# Patient Record
Sex: FEMALE | Race: WHITE | NOT HISPANIC OR LATINO | Employment: UNEMPLOYED | URBAN - METROPOLITAN AREA
[De-identification: names, ages, dates, MRNs, and addresses within clinical notes are randomized per-mention and may not be internally consistent; named-entity substitution may affect disease eponyms.]

---

## 2017-01-25 PROBLEM — R10.9 FLANK PAIN, ACUTE: Status: ACTIVE | Noted: 2017-01-25

## 2017-02-07 ENCOUNTER — NURSE TRIAGE (OUTPATIENT)
Dept: ADMINISTRATIVE | Facility: CLINIC | Age: 42
End: 2017-02-07

## 2017-02-07 PROBLEM — A41.9 SEPSIS: Status: ACTIVE | Noted: 2017-02-07

## 2017-02-07 NOTE — TELEPHONE ENCOUNTER
Pt crying and moaning on the phone- complaining of 10/10 right flank pain. Advised to go to ER for further eval    Reason for Disposition   SEVERE pain (e.g., excruciating, scale 8-10) and present > 1 hour    Protocols used: ST FLANK PAIN-A-[facility]

## 2017-02-08 PROBLEM — N20.0 RENAL CALCULI: Status: ACTIVE | Noted: 2017-02-08

## 2017-02-08 PROBLEM — N12 PYELONEPHRITIS: Status: ACTIVE | Noted: 2017-02-08

## 2017-02-14 PROBLEM — A41.9 SEPSIS: Status: RESOLVED | Noted: 2017-02-07 | Resolved: 2017-02-14

## 2017-03-15 PROBLEM — N20.0 NEPHROLITHIASIS: Status: ACTIVE | Noted: 2017-03-15

## 2017-05-28 PROBLEM — I26.99 PULMONARY EMBOLUS: Status: ACTIVE | Noted: 2017-05-28

## 2017-05-29 PROBLEM — Z91.199 HISTORY OF NONCOMPLIANCE WITH MEDICAL TREATMENT: Status: ACTIVE | Noted: 2017-05-29

## 2017-06-24 PROBLEM — R07.9 CHEST PAIN: Status: ACTIVE | Noted: 2017-06-24

## 2017-06-25 PROBLEM — F17.200 TOBACCO DEPENDENCE: Status: ACTIVE | Noted: 2017-06-25

## 2017-07-18 ENCOUNTER — NURSE TRIAGE (OUTPATIENT)
Dept: ADMINISTRATIVE | Facility: CLINIC | Age: 42
End: 2017-07-18

## 2017-07-18 NOTE — TELEPHONE ENCOUNTER
"  Reason for Disposition   [1] SEVERE pain (e.g., excruciating, scale 8-10) AND [2] present > 1 hour    Answer Assessment - Initial Assessment Questions  1. LOCATION: "Where does it hurt?" (e.g., left, right)      Flank area bilaterally  2. ONSET: "When did the pain start?"      Ongoing x months  3. SEVERITY: "How bad is the pain?" (e.g., Scale 1-10; mild, moderate, or severe)    - MILD (1-3): doesn't interfere with normal activities     - MODERATE (4-7): interferes with normal activities or awakens from sleep     - SEVERE (8-10): excruciating pain and patient unable to do normal activities (stays in bed)        Now 10/10  4. PATTERN: "Does the pain come and go, or is it constant?"       Now constant  5. CAUSE: "What do you think is causing the pain?"      R/t kidney stones  6. OTHER SYMPTOMS:  "Do you have any other symptoms?" (e.g., fever, abdominal pain, vomiting, leg weakness, burning with urination, blood in urine)      Temp 100.2/+ nausea and vomiting/ denies dysuria or hematuria  7. PREGNANCY:  "Is there any chance you are pregnant?" "When was your last menstrual period?"      Didn't ask    Protocols used: ST FLANK PAIN-A-AH  pt had lithotripsy in Feb and again in March/ states she only passes a small amount of urine @ a time since lithotripsy/ hasn't spoken w/ urology about this problem/ now has flank and side pain/  today states flank and side pain 10/10/ + temp 100.2/ + nausea and vomiting    To ER for evaluation and tx    Dania Arellano RN  "

## 2017-10-02 PROBLEM — I26.99 OTHER PULMONARY EMBOLISM WITHOUT ACUTE COR PULMONALE: Status: ACTIVE | Noted: 2017-10-02

## 2017-10-03 PROBLEM — F20.9 SCHIZOPHRENIA: Status: ACTIVE | Noted: 2017-10-03

## 2017-11-19 PROBLEM — K92.2 GASTROINTESTINAL HEMORRHAGE: Status: ACTIVE | Noted: 2017-11-19

## 2017-11-19 PROBLEM — K92.0 HEMATEMESIS: Status: ACTIVE | Noted: 2017-11-19

## 2017-12-07 PROBLEM — R05.9 COUGH WITH FEVER: Status: ACTIVE | Noted: 2017-12-07

## 2017-12-07 PROBLEM — J98.11 ATELECTASIS OF RIGHT LUNG: Status: ACTIVE | Noted: 2017-12-07

## 2017-12-07 PROBLEM — R50.9 COUGH WITH FEVER: Status: ACTIVE | Noted: 2017-12-07

## 2017-12-07 PROBLEM — J20.9 BRONCHITIS WITH ASTHMA, ACUTE: Status: ACTIVE | Noted: 2017-12-07

## 2017-12-07 PROBLEM — J45.909 BRONCHITIS WITH ASTHMA, ACUTE: Status: ACTIVE | Noted: 2017-12-07

## 2018-03-25 PROBLEM — N30.00 ACUTE CYSTITIS WITHOUT HEMATURIA: Status: ACTIVE | Noted: 2018-03-25

## 2018-03-25 PROBLEM — R09.1 PLEURISY: Status: ACTIVE | Noted: 2018-03-25

## 2018-10-26 PROBLEM — I26.99 BILATERAL PULMONARY EMBOLISM: Status: ACTIVE | Noted: 2018-10-26

## 2019-05-20 PROBLEM — N39.0 COMPLICATED UTI (URINARY TRACT INFECTION): Status: ACTIVE | Noted: 2019-05-20

## 2019-06-27 PROBLEM — K92.0 HEMATEMESIS: Status: RESOLVED | Noted: 2017-11-19 | Resolved: 2019-06-27

## 2019-07-08 PROBLEM — Z01.818 PRE-OP EXAM: Status: ACTIVE | Noted: 2019-07-08

## 2020-02-27 PROBLEM — R04.2 HEMOPTYSIS: Status: ACTIVE | Noted: 2020-02-27

## 2020-02-27 PROBLEM — Z86.711 HISTORY OF PULMONARY EMBOLISM: Chronic | Status: ACTIVE | Noted: 2020-02-27

## 2020-02-27 PROBLEM — K80.20 CHOLELITHIASIS: Chronic | Status: ACTIVE | Noted: 2020-02-27

## 2020-02-27 PROBLEM — J18.9 CAP (COMMUNITY ACQUIRED PNEUMONIA): Status: ACTIVE | Noted: 2020-02-27

## 2020-02-29 PROBLEM — J06.9 VIRAL UPPER RESPIRATORY TRACT INFECTION: Status: ACTIVE | Noted: 2020-02-27

## 2020-03-06 PROBLEM — K80.50 BILIARY COLIC: Status: ACTIVE | Noted: 2020-03-06

## 2020-06-15 PROBLEM — Z98.890 S/P CARDIAC CATH: Status: ACTIVE | Noted: 2020-06-15

## 2020-06-15 PROBLEM — R94.39 ABNORMAL STRESS TEST: Status: ACTIVE | Noted: 2020-06-15

## 2020-06-23 PROBLEM — K80.10 CALCULUS OF GALLBLADDER WITH CHRONIC CHOLECYSTITIS WITHOUT OBSTRUCTION: Status: ACTIVE | Noted: 2020-06-23

## 2020-06-28 ENCOUNTER — NURSE TRIAGE (OUTPATIENT)
Dept: ADMINISTRATIVE | Facility: CLINIC | Age: 45
End: 2020-06-28

## 2020-06-28 NOTE — TELEPHONE ENCOUNTER
Post procedure symptom tracker. Pt presented to ED on 6/26/20 for post op pain 11 days post op. Denied cough, fever, or SOB. Per symptom tracker protocol, no contact made at this time. No follow up needed.    Reason for Disposition   Caller has cancelled the call before the first contact    Protocols used: NO CONTACT OR DUPLICATE CONTACT CALL-A-AH

## 2020-06-30 PROBLEM — R94.39 ABNORMAL STRESS TEST: Status: RESOLVED | Noted: 2020-06-15 | Resolved: 2020-06-30

## 2020-06-30 PROBLEM — I25.10 NON-OCCLUSIVE CORONARY ARTERY DISEASE: Status: ACTIVE | Noted: 2020-06-30

## 2020-07-06 ENCOUNTER — NURSE TRIAGE (OUTPATIENT)
Dept: ADMINISTRATIVE | Facility: CLINIC | Age: 45
End: 2020-07-06

## 2020-07-07 NOTE — TELEPHONE ENCOUNTER
Post Procedural Symptom Tracker. Pt had a hospital visit on 7/1/20Per symptom tracker protocol, no contact made. No follow up needed.  Reason for Disposition   Patient already left for the hospital/clinic.    Additional Information   Negative: Caller is angry or rude (e.g., hangs up, verbally abusive, yelling)   Negative: Caller hangs up   Negative: Caller has already spoken with the PCP and has no further questions.   Negative: Caller has already spoken with another triager and has no further questions.   Negative: Caller has already spoken with another triager or PCP AND has further questions AND triager able to answer questions.   Negative: Message left on identified voice mail   Negative: Message left on unidentified voice mail.  Phone number verified.   Negative: Message left with person in household.   Negative: Wrong number reached.  Phone number verified.   Negative: Second attempt to contact family AND no contact made.  Phone number verified.   Negative: Cell phone out of range.  Phone number verified.   Negative: Pager number given.  Answering service notified.    Protocols used: NO CONTACT OR [facility]PLIC[facility]

## 2020-11-09 PROBLEM — M79.605 LEG PAIN, DIFFUSE, LEFT: Status: ACTIVE | Noted: 2020-11-09

## 2020-11-09 PROBLEM — L03.90 CELLULITIS: Status: ACTIVE | Noted: 2020-11-09

## 2020-11-09 PROBLEM — L03.90 CELLULITIS: Status: RESOLVED | Noted: 2020-11-09 | Resolved: 2020-11-09

## 2020-11-09 PROBLEM — L03.116 CELLULITIS OF LEFT LOWER EXTREMITY: Status: ACTIVE | Noted: 2020-11-09

## 2020-11-14 PROBLEM — R07.81 PLEURITIC CHEST PAIN: Status: ACTIVE | Noted: 2017-06-24

## 2020-11-14 PROBLEM — R55 NEAR SYNCOPE: Status: ACTIVE | Noted: 2020-11-14

## 2020-11-14 PROBLEM — R06.02 SOB (SHORTNESS OF BREATH): Status: ACTIVE | Noted: 2020-11-14

## 2021-04-03 PROBLEM — K52.9 GASTROENTERITIS: Status: ACTIVE | Noted: 2021-04-03

## 2021-05-19 PROBLEM — R13.10 DYSPHAGIA: Status: ACTIVE | Noted: 2021-05-19

## 2021-05-23 PROBLEM — I82.4Y2 ACUTE DEEP VEIN THROMBOSIS (DVT) OF PROXIMAL VEIN OF LEFT LOWER EXTREMITY: Status: ACTIVE | Noted: 2021-05-23

## 2021-05-25 PROBLEM — I26.99 ACUTE PULMONARY EMBOLISM: Status: RESOLVED | Noted: 2017-05-28 | Resolved: 2021-05-25

## 2021-05-30 PROBLEM — I31.39 PERICARDIAL EFFUSION (NONINFLAMMATORY): Status: ACTIVE | Noted: 2021-05-30

## 2021-05-30 PROBLEM — I82.432 ACUTE DEEP VEIN THROMBOSIS (DVT) OF POPLITEAL VEIN OF LEFT LOWER EXTREMITY: Status: ACTIVE | Noted: 2021-05-30

## 2021-05-30 PROBLEM — M79.89 LEG SWELLING: Status: ACTIVE | Noted: 2021-05-30

## 2021-05-30 PROBLEM — I31.39 PERICARDIAL EFFUSION: Status: ACTIVE | Noted: 2021-05-30

## 2022-04-05 PROBLEM — R55 COUGH SYNCOPE: Status: ACTIVE | Noted: 2022-04-05

## 2022-04-05 PROBLEM — J45.40 MODERATE PERSISTENT ASTHMA WITHOUT COMPLICATION: Status: ACTIVE | Noted: 2022-04-05

## 2022-04-05 PROBLEM — F31.32 BIPOLAR 1 DISORDER, DEPRESSED, MODERATE: Status: ACTIVE | Noted: 2022-04-05

## 2022-04-05 PROBLEM — R05.4 COUGH SYNCOPE: Status: ACTIVE | Noted: 2022-04-05

## 2022-04-05 PROBLEM — E87.1 HYPONATREMIA: Status: ACTIVE | Noted: 2022-04-05

## 2022-04-05 PROBLEM — G47.33 OSA (OBSTRUCTIVE SLEEP APNEA): Status: ACTIVE | Noted: 2022-04-05

## 2022-04-05 PROBLEM — D75.1 POLYCYTHEMIA: Status: ACTIVE | Noted: 2022-04-05

## 2022-04-05 PROBLEM — E66.01 CLASS 3 SEVERE OBESITY DUE TO EXCESS CALORIES WITH SERIOUS COMORBIDITY AND BODY MASS INDEX (BMI) OF 45.0 TO 49.9 IN ADULT: Status: ACTIVE | Noted: 2022-04-05

## 2022-04-05 PROBLEM — Z79.899 POLYPHARMACY: Status: ACTIVE | Noted: 2022-04-05

## 2022-04-06 PROBLEM — R20.0 NUMBNESS AND TINGLING OF RIGHT FACE: Status: ACTIVE | Noted: 2022-04-06

## 2022-04-06 PROBLEM — R20.2 NUMBNESS AND TINGLING OF RIGHT FACE: Status: ACTIVE | Noted: 2022-04-06

## 2023-04-22 PROBLEM — R29.90 NEUROLOGICAL SYMPTOMS: Status: ACTIVE | Noted: 2023-04-22

## 2023-04-23 ENCOUNTER — HOSPITAL ENCOUNTER (OUTPATIENT)
Facility: HOSPITAL | Age: 48
Discharge: HOME | End: 2023-04-25
Attending: FAMILY MEDICINE | Admitting: FAMILY MEDICINE
Payer: MEDICAID

## 2023-04-23 DIAGNOSIS — G45.9 TIA (TRANSIENT ISCHEMIC ATTACK): ICD-10-CM

## 2023-04-23 DIAGNOSIS — R20.0 NUMBNESS AND TINGLING OF LEFT SIDE OF FACE: Primary | ICD-10-CM

## 2023-04-23 DIAGNOSIS — R20.2 NUMBNESS AND TINGLING OF LEFT SIDE OF FACE: Primary | ICD-10-CM

## 2023-04-23 DIAGNOSIS — R42 DIZZINESS: ICD-10-CM

## 2023-04-23 LAB
CHOLEST SERPL-MCNC: 178 MG/DL (ref 120–199)
CHOLEST/HDLC SERPL: 3.6 {RATIO} (ref 2–5)
ESTIMATED AVG GLUCOSE: 111 MG/DL (ref 68–131)
HBA1C MFR BLD: 5.5 % (ref 4–5.6)
HDLC SERPL-MCNC: 49 MG/DL (ref 40–75)
HDLC SERPL: 27.5 % (ref 20–50)
LDLC SERPL CALC-MCNC: 106 MG/DL (ref 63–159)
NONHDLC SERPL-MCNC: 129 MG/DL
TRIGL SERPL-MCNC: 115 MG/DL (ref 30–150)
TSH SERPL DL<=0.005 MIU/L-ACNC: 1.63 UIU/ML (ref 0.4–4)
VIT B12 SERPL-MCNC: 307 PG/ML (ref 210–950)

## 2023-04-23 PROCEDURE — G0379 DIRECT REFER HOSPITAL OBSERV: HCPCS

## 2023-04-23 PROCEDURE — 80061 LIPID PANEL: CPT | Performed by: STUDENT IN AN ORGANIZED HEALTH CARE EDUCATION/TRAINING PROGRAM

## 2023-04-23 PROCEDURE — 94761 N-INVAS EAR/PLS OXIMETRY MLT: CPT

## 2023-04-23 PROCEDURE — 99900035 HC TECH TIME PER 15 MIN (STAT)

## 2023-04-23 PROCEDURE — 25500020 PHARM REV CODE 255: Performed by: FAMILY MEDICINE

## 2023-04-23 PROCEDURE — 82607 VITAMIN B-12: CPT | Performed by: STUDENT IN AN ORGANIZED HEALTH CARE EDUCATION/TRAINING PROGRAM

## 2023-04-23 PROCEDURE — 99223 PR INITIAL HOSPITAL CARE,LEVL III: ICD-10-PCS | Mod: ,,, | Performed by: PSYCHIATRY & NEUROLOGY

## 2023-04-23 PROCEDURE — 36415 COLL VENOUS BLD VENIPUNCTURE: CPT | Performed by: STUDENT IN AN ORGANIZED HEALTH CARE EDUCATION/TRAINING PROGRAM

## 2023-04-23 PROCEDURE — 83036 HEMOGLOBIN GLYCOSYLATED A1C: CPT | Performed by: STUDENT IN AN ORGANIZED HEALTH CARE EDUCATION/TRAINING PROGRAM

## 2023-04-23 PROCEDURE — 25000003 PHARM REV CODE 250: Performed by: STUDENT IN AN ORGANIZED HEALTH CARE EDUCATION/TRAINING PROGRAM

## 2023-04-23 PROCEDURE — 84443 ASSAY THYROID STIM HORMONE: CPT | Performed by: STUDENT IN AN ORGANIZED HEALTH CARE EDUCATION/TRAINING PROGRAM

## 2023-04-23 PROCEDURE — 84425 ASSAY OF VITAMIN B-1: CPT | Performed by: STUDENT IN AN ORGANIZED HEALTH CARE EDUCATION/TRAINING PROGRAM

## 2023-04-23 PROCEDURE — G0378 HOSPITAL OBSERVATION PER HR: HCPCS

## 2023-04-23 PROCEDURE — 99223 1ST HOSP IP/OBS HIGH 75: CPT | Mod: ,,, | Performed by: PSYCHIATRY & NEUROLOGY

## 2023-04-23 RX ORDER — ALBUTEROL SULFATE 90 UG/1
2 AEROSOL, METERED RESPIRATORY (INHALATION) EVERY 6 HOURS PRN
Status: DISCONTINUED | OUTPATIENT
Start: 2023-04-23 | End: 2023-04-25 | Stop reason: HOSPADM

## 2023-04-23 RX ORDER — CLONIDINE HYDROCHLORIDE 0.1 MG/1
0.2 TABLET ORAL NIGHTLY
Status: DISCONTINUED | OUTPATIENT
Start: 2023-04-23 | End: 2023-04-25 | Stop reason: HOSPADM

## 2023-04-23 RX ORDER — HYDROCHLOROTHIAZIDE 12.5 MG/1
12.5 TABLET ORAL DAILY
Status: DISCONTINUED | OUTPATIENT
Start: 2023-04-23 | End: 2023-04-25 | Stop reason: HOSPADM

## 2023-04-23 RX ORDER — LABETALOL HYDROCHLORIDE 5 MG/ML
10 INJECTION, SOLUTION INTRAVENOUS
Status: DISCONTINUED | OUTPATIENT
Start: 2023-04-23 | End: 2023-04-25 | Stop reason: HOSPADM

## 2023-04-23 RX ORDER — SODIUM CHLORIDE 0.9 % (FLUSH) 0.9 %
10 SYRINGE (ML) INJECTION
Status: DISCONTINUED | OUTPATIENT
Start: 2023-04-23 | End: 2023-04-25 | Stop reason: HOSPADM

## 2023-04-23 RX ORDER — DEXTROSE 40 %
30 GEL (GRAM) ORAL
Status: DISCONTINUED | OUTPATIENT
Start: 2023-04-23 | End: 2023-04-25 | Stop reason: HOSPADM

## 2023-04-23 RX ORDER — CETIRIZINE HYDROCHLORIDE 10 MG/1
10 TABLET ORAL DAILY
Status: DISCONTINUED | OUTPATIENT
Start: 2023-04-23 | End: 2023-04-25 | Stop reason: HOSPADM

## 2023-04-23 RX ORDER — HYDROXYZINE PAMOATE 25 MG/1
50 CAPSULE ORAL NIGHTLY
Status: DISCONTINUED | OUTPATIENT
Start: 2023-04-23 | End: 2023-04-25 | Stop reason: HOSPADM

## 2023-04-23 RX ORDER — OXCARBAZEPINE 150 MG/1
600 TABLET, FILM COATED ORAL 2 TIMES DAILY
Status: DISCONTINUED | OUTPATIENT
Start: 2023-04-23 | End: 2023-04-25 | Stop reason: HOSPADM

## 2023-04-23 RX ORDER — ATORVASTATIN CALCIUM 40 MG/1
40 TABLET, FILM COATED ORAL DAILY
Status: DISCONTINUED | OUTPATIENT
Start: 2023-04-23 | End: 2023-04-25 | Stop reason: HOSPADM

## 2023-04-23 RX ORDER — DEXTROSE 40 %
15 GEL (GRAM) ORAL
Status: DISCONTINUED | OUTPATIENT
Start: 2023-04-23 | End: 2023-04-25 | Stop reason: HOSPADM

## 2023-04-23 RX ORDER — BUSPIRONE HYDROCHLORIDE 5 MG/1
10 TABLET ORAL 3 TIMES DAILY
Status: DISCONTINUED | OUTPATIENT
Start: 2023-04-23 | End: 2023-04-25 | Stop reason: HOSPADM

## 2023-04-23 RX ORDER — QUETIAPINE FUMARATE 100 MG/1
400 TABLET, FILM COATED ORAL NIGHTLY
Status: DISCONTINUED | OUTPATIENT
Start: 2023-04-23 | End: 2023-04-25 | Stop reason: HOSPADM

## 2023-04-23 RX ORDER — METOPROLOL TARTRATE 25 MG/1
25 TABLET, FILM COATED ORAL DAILY
Status: DISCONTINUED | OUTPATIENT
Start: 2023-04-23 | End: 2023-04-25 | Stop reason: HOSPADM

## 2023-04-23 RX ORDER — CLONAZEPAM 0.5 MG/1
0.5 TABLET ORAL 2 TIMES DAILY PRN
Status: DISCONTINUED | OUTPATIENT
Start: 2023-04-23 | End: 2023-04-25 | Stop reason: HOSPADM

## 2023-04-23 RX ORDER — VALSARTAN 40 MG/1
80 TABLET ORAL 2 TIMES DAILY
Status: DISCONTINUED | OUTPATIENT
Start: 2023-04-23 | End: 2023-04-25 | Stop reason: HOSPADM

## 2023-04-23 RX ADMIN — HYDROXYZINE PAMOATE 50 MG: 25 CAPSULE ORAL at 08:04

## 2023-04-23 RX ADMIN — IOHEXOL 100 ML: 350 INJECTION, SOLUTION INTRAVENOUS at 01:04

## 2023-04-23 RX ADMIN — RIVAROXABAN 20 MG: 20 TABLET, FILM COATED ORAL at 01:04

## 2023-04-23 RX ADMIN — BUSPIRONE HYDROCHLORIDE 10 MG: 5 TABLET ORAL at 03:04

## 2023-04-23 RX ADMIN — CETIRIZINE HYDROCHLORIDE 10 MG: 10 TABLET, FILM COATED ORAL at 05:04

## 2023-04-23 RX ADMIN — ISOSORBIDE DINITRATE 30 MG: 10 TABLET ORAL at 05:04

## 2023-04-23 RX ADMIN — OXCARBAZEPINE 600 MG: 150 TABLET, FILM COATED ORAL at 08:04

## 2023-04-23 RX ADMIN — CLONAZEPAM 0.5 MG: 0.5 TABLET ORAL at 03:04

## 2023-04-23 RX ADMIN — VALSARTAN 80 MG: 40 TABLET, FILM COATED ORAL at 08:04

## 2023-04-23 RX ADMIN — QUETIAPINE FUMARATE 400 MG: 100 TABLET ORAL at 08:04

## 2023-04-23 RX ADMIN — HYDROCHLOROTHIAZIDE 12.5 MG: 12.5 TABLET ORAL at 01:04

## 2023-04-23 RX ADMIN — BUSPIRONE HYDROCHLORIDE 10 MG: 5 TABLET ORAL at 09:04

## 2023-04-23 RX ADMIN — ATORVASTATIN CALCIUM 40 MG: 40 TABLET, FILM COATED ORAL at 01:04

## 2023-04-23 RX ADMIN — METOPROLOL TARTRATE 25 MG: 25 TABLET, FILM COATED ORAL at 05:04

## 2023-04-23 RX ADMIN — CLONIDINE HYDROCHLORIDE 0.2 MG: 0.1 TABLET ORAL at 08:04

## 2023-04-23 NOTE — PHARMACY MED REC
"  [facility]           Pharmacy  Admission Medication History     The home medication history was taken by Faith Ray PharmD.      Medication history obtained from Medications listed below were obtained from: Patient/family    Based on information gathered for medication list, you may go to "Admission" then "Reconcile Home Medications" tabs to review and/or act upon those items.     The home medication list has been updated by the Pharmacy department.   Please read ALL comments highlighted in yellow.   Please address this information as you see fit.    Feel free to contact us if you have any questions or require assistance.    The current inpatient medication list has been compared to the home medication list and the following discrepancies were noted:    Patient reports NOT TAKING the following medication(s):    Patient reports he/she IS TAKING the following which was not ordered upon admit  ISOSORBIDE DINITRATE 30MG DAILY  Patient reports taking a DIFFERENT DRUG than that ordered upon admit    Patient reports taking a drug DIFFERENTLY than how ordered upon admit      Potential issues to be addressed PRIOR TO DISCHARGE      Current Facility-Administered Medications on File Prior to Encounter   Medication Dose Route Frequency Provider Last Rate Last Admin    [COMPLETED] diphenhydrAMINE injection 25 mg  25 mg Intravenous ED 1 Time Eddie Abraham MD   25 mg at 04/22/23 2001    [COMPLETED] droPERidol injection 1.25 mg  1.25 mg Intravenous ED 1 Time Eddie Abraham MD   1.25 mg at 04/22/23 2001    [COMPLETED] ondansetron injection 4 mg  4 mg Intravenous ED 1 Time Eddie Abraham MD   4 mg at 04/22/23 1845     Current Outpatient Medications on File Prior to Encounter   Medication Sig Dispense Refill    albuterol (ACCUNEB) 1.25 mg/3 mL Nebu Take 1.25 mg by nebulization every 6 (six) hours as needed. Rescue      atorvastatin (LIPITOR) 40 MG tablet Take 40 mg by mouth once daily.      busPIRone " (BUSPAR) 10 MG tablet Take 10 mg by mouth 3 (three) times daily.      cetirizine (ZYRTEC) 10 MG tablet Take 10 mg by mouth Daily.      clonazePAM (KLONOPIN) 0.5 MG tablet Take 0.5 mg by mouth 2 (two) times daily as needed for Anxiety.      cloNIDine (CATAPRES) 0.2 MG tablet Take 0.2 mg by mouth every evening.      cyclobenzaprine (FLEXERIL) 10 MG tablet Take 1 tablet (10 mg total) by mouth 3 (three) times daily as needed for Muscle spasms. 15 tablet 0    fluticasone propionate (FLONASE) 50 mcg/actuation nasal spray 1 spray (50 mcg total) by Each Nostril route 2 (two) times daily as needed for Rhinitis. 15 g 0    hydrocortisone 2.5 % cream Apply topically 2 (two) times daily. 28 g 0    hydrOXYzine pamoate (VISTARIL) 25 MG Cap Take 1 capsule (25 mg total) by mouth every 6 (six) hours as needed (itching). 16 capsule 0    INVEGA SUSTENNA 234 mg/1.5 mL Syrg injection Inject 234 mg into the muscle once.      isosorbide dinitrate (ISORDIL) 30 MG Tab Take 30 mg by mouth Daily.      metoprolol tartrate (LOPRESSOR) 25 MG tablet Take 25 mg by mouth Daily.      OXcarbazepine (TRILEPTAL) 600 MG Tab Take 600 mg by mouth 2 (two) times daily.      QUEtiapine (SEROQUEL) 400 MG tablet Take 400 mg by mouth every evening.      rivaroxaban (XARELTO) 20 mg Tab Take 1 tablet (20 mg total) by mouth once daily. 30 tablet 3    SYMBICORT 80-4.5 mcg/actuation HFAA Inhale 2 puffs into the lungs 2 (two) times a day.       valsartan-hydrochlorothiazide (DIOVAN-HCT) 80-12.5 mg per tablet Take 1 tablet by mouth.      [DISCONTINUED] DULoxetine (CYMBALTA) 60 MG capsule Take 60 mg by mouth 2 (two) times daily.      [DISCONTINUED] famotidine (PEPCID) 20 MG tablet Take 1 tablet (20 mg total) by mouth 2 (two) times daily. 30 tablet 0    [DISCONTINUED] gabapentin (NEURONTIN) 300 MG capsule Take 300 mg by mouth.      [DISCONTINUED] haloperidoL (HALDOL) 0.5 MG tablet Take 0.25 mg by mouth every evening.      [DISCONTINUED] nitroGLYCERIN (NITROSTAT) 0.4 MG  SL tablet Place 1 tablet (0.4 mg total) under the tongue every 5 (five) minutes as needed. 25 tablet 5    [DISCONTINUED] pantoprazole (PROTONIX) 40 MG tablet Take 1 tablet (40 mg total) by mouth once daily. 30 tablet 2    [DISCONTINUED] tamsulosin (FLOMAX) 0.4 mg Cap Take 1 capsule (0.4 mg total) by mouth once daily. for 10 days 10 capsule 0    [DISCONTINUED] traZODone (DESYREL) 300 MG tablet Take 300 mg by mouth every evening.         Please address this information as you see fit.  Feel free to contact us if you have any questions or require assistance.    Faith Ray, PharmD  [phone]                .

## 2023-04-23 NOTE — SUBJECTIVE & OBJECTIVE
"Past Medical History:   Diagnosis Date    Abnormal uterine bleeding     Adjustment disorder 03/02/2020    Patient stated that the divorce issues "still linger within" her soul. Patient stated she is grateful she had no children.     Alcohol abuse 02/09/2022    Patient stated that she does occasionally use alcohol in excess.     Anticoagulant long-term use     Anxiety 2016    Patient stated that she went to the [facility] six years ago for her anxiety disorder.     Arthritis     Asthma     Bipolar 1 disorder     Chronic pain     Depression 2016    Patient stated that her depression and anxiety have been occurring around the same time.     DVT (deep venous thrombosis)     GERD without esophagitis 3/23/2016    H/O bilateral salpingectomy     History of psychiatric hospitalization 02/01/2020    Patient stated that she thinks she was last hospitalized aound 2020.     Hx of psychiatric care 2016    Patient stated she has been non-complaint with her psychiatric medicaitons.     Hypertension     Insomnia     Kidney stone     Migraine headache     Non-occlusive coronary artery disease 6/30/2020    Psychiatric problem 2016    Patient stated that she would agree with the statement that she "Numbs" things out.     Pulmonary embolism     Schizoaffective disorder     Schizophrenia     Seizures     "few years ago"    Status post laparoscopic assisted vaginal hysterectomy (LAVH)     with BSO    Substance abuse     Suicide attempt 09/13/2020    Patient stated that she does not remember the method or plan when she last attempted suicide; says that she did not go to the hospital for the suicide attempt though; thinks it maight have involved a drug overdose on her prescribed medications.      Therapy 2016    Patient has been attending the [facility] and is in their case management program.     Tobacco abuse 2/4/2015       Past Surgical History:   Procedure Laterality Date    CERVICAL BIOPSY  W/ LOOP ELECTRODE EXCISION   "     SECTION  2001    CHOLECYSTECTOMY      COLONOSCOPY N/A 2022    Procedure: COLONOSCOPY;  Surgeon: Cristiane Chavez MD;  Location: [facility];  Service: Endoscopy;  Laterality: N/A;    CYSTOSCOPY W/ URETERAL STENT PLACEMENT Left 2019    Procedure: CYSTOSCOPY, WITH URETERAL STENT INSERTION;  Surgeon: Beto Sawant MD;  Location: [facility];  Service: Urology;  Laterality: Left;    CYSTOSCOPY WITH URETEROSCOPY, RETROGRADE PYELOGRAPHY, AND INSERTION OF STENT Left 2019    Procedure: CYSTOSCOPY, WITH RETROGRADE PYELOGRAM AND URETERAL STENT INSERTION;  Surgeon: Rich García II, MD;  Location: [facility];  Service: Urology;  Laterality: Left;    ENDOMETRIAL ABLATION      ESOPHAGOGASTRODUODENOSCOPY N/A 2021    Procedure: EGD (ESOPHAGOGASTRODUODENOSCOPY);  Surgeon: Cristiane Chavez MD;  Location: [facility];  Service: Endoscopy;  Laterality: N/A;    ESOPHAGOGASTRODUODENOSCOPY N/A 2022    Procedure: EGD (ESOPHAGOGASTRODUODENOSCOPY);  Surgeon: Cristiane Chavez MD;  Location: [facility];  Service: Endoscopy;  Laterality: N/A;    HYSTERECTOMY      KIDNEY SURGERY      KNEE SURGERY Left     Replacement of left knee cap    LAPAROSCOPIC CHOLECYSTECTOMY N/A 2020    Procedure: CHOLECYSTECTOMY, LAPAROSCOPIC;  Surgeon: Luis Bogran-Reyes, MD;  Location: [facility];  Service: General;  Laterality: N/A;    LASER LITHOTRIPSY Left 2019    Procedure: LITHOTRIPSY, USING LASER;  Surgeon: Rich García II, MD;  Location: [facility];  Service: Urology;  Laterality: Left;    LEFT HEART CATHETERIZATION Left 6/15/2020    Procedure: Left heart cath;  Surgeon: Oseas Olson MD;  Location: [facility] CATH LAB;  Service: Cardiology;  Laterality: Left;    PHLEBOGRAPHY N/A 2021    Procedure: Venogram;  Surgeon: Eliel Perez MD;  Location: [facility] CATH;  Service: Cardiovascular;  Laterality: N/A;    TONSILLECTOMY, ADENOIDECTOMY      TUBAL LIGATION      URETEROSCOPY Left 2019     Procedure: URETEROSCOPY;  Surgeon: Rich García II, MD;  Location: [facility];  Service: Urology;  Laterality: Left;    WISDOM TOOTH EXTRACTION         Review of patient's allergies indicates:   Allergen Reactions    Penicillins Anaphylaxis    Aspirin Itching    Aspirin, buffered     Remeron [mirtazapine]     Tramadol Itching     swelling    Ibuprofen Itching    Toradol [ketorolac] Rash       Current Facility-Administered Medications on File Prior to Encounter   Medication    [COMPLETED] diphenhydrAMINE injection 25 mg    [COMPLETED] droPERidol injection 1.25 mg    [COMPLETED] ondansetron injection 4 mg     Current Outpatient Medications on File Prior to Encounter   Medication Sig    albuterol (ACCUNEB) 1.25 mg/3 mL Nebu Take 1.25 mg by nebulization every 6 (six) hours as needed. Rescue    atorvastatin (LIPITOR) 40 MG tablet Take 40 mg by mouth once daily.    busPIRone (BUSPAR) 10 MG tablet Take 10 mg by mouth 3 (three) times daily.    cetirizine (ZYRTEC) 10 MG tablet Take 10 mg by mouth Daily.    clonazePAM (KLONOPIN) 0.5 MG tablet Take 0.5 mg by mouth 2 (two) times daily as needed for Anxiety.    cloNIDine (CATAPRES) 0.2 MG tablet Take 0.2 mg by mouth every evening.    cyclobenzaprine (FLEXERIL) 10 MG tablet Take 1 tablet (10 mg total) by mouth 3 (three) times daily as needed for Muscle spasms.    fluticasone propionate (FLONASE) 50 mcg/actuation nasal spray 1 spray (50 mcg total) by Each Nostril route 2 (two) times daily as needed for Rhinitis.    hydrocortisone 2.5 % cream Apply topically 2 (two) times daily.    hydrOXYzine pamoate (VISTARIL) 25 MG Cap Take 1 capsule (25 mg total) by mouth every 6 (six) hours as needed (itching).    INVEGA SUSTENNA 234 mg/1.5 mL Syrg injection Inject 234 mg into the muscle once.    isosorbide dinitrate (ISORDIL) 30 MG Tab Take 30 mg by mouth Daily.    metoprolol tartrate (LOPRESSOR) 25 MG tablet Take 25 mg by mouth Daily.    OXcarbazepine (TRILEPTAL) 600 MG Tab Take 600 mg by  mouth 2 (two) times daily.    QUEtiapine (SEROQUEL) 400 MG tablet Take 400 mg by mouth every evening.    rivaroxaban (XARELTO) 20 mg Tab Take 1 tablet (20 mg total) by mouth once daily.    SYMBICORT 80-4.5 mcg/actuation HFAA Inhale 2 puffs into the lungs 2 (two) times a day.     valsartan-hydrochlorothiazide (DIOVAN-HCT) 80-12.5 mg per tablet Take 1 tablet by mouth.    [DISCONTINUED] DULoxetine (CYMBALTA) 60 MG capsule Take 60 mg by mouth 2 (two) times daily.    [DISCONTINUED] famotidine (PEPCID) 20 MG tablet Take 1 tablet (20 mg total) by mouth 2 (two) times daily.    [DISCONTINUED] gabapentin (NEURONTIN) 300 MG capsule Take 300 mg by mouth.    [DISCONTINUED] haloperidoL (HALDOL) 0.5 MG tablet Take 0.25 mg by mouth every evening.    [DISCONTINUED] nitroGLYCERIN (NITROSTAT) 0.4 MG SL tablet Place 1 tablet (0.4 mg total) under the tongue every 5 (five) minutes as needed.    [DISCONTINUED] pantoprazole (PROTONIX) 40 MG tablet Take 1 tablet (40 mg total) by mouth once daily.    [DISCONTINUED] tamsulosin (FLOMAX) 0.4 mg Cap Take 1 capsule (0.4 mg total) by mouth once daily. for 10 days    [DISCONTINUED] traZODone (DESYREL) 300 MG tablet Take 300 mg by mouth every evening.     Family History       Problem Relation (Age of Onset)    Alcohol abuse Mother, Father    Arthritis Father    Asthma Mother    COPD Father    Cancer Father    Colon cancer Brother    Depression Mother    Drug abuse Mother    Early death Mother    Hearing loss Father    Heart disease Father    Hyperlipidemia Mother, Father    Hypertension Father    Learning disabilities Mother    Mental illness Mother    Miscarriages / Stillbirths Mother    No Known Problems Sister    Stroke Father          Tobacco Use    Smoking status: Every Day     Packs/day: 1.50     Years: 25.00     Pack years: 37.50     Types: Cigarettes     Start date: 2/4/1991    Smokeless tobacco: Never   Substance and Sexual Activity    Alcohol use: No     Alcohol/week: 0.0 standard drinks     Drug use: Not Currently     Types: Amphetamines     Comment: denies    Sexual activity: Yes     Partners: Male     Birth control/protection: Surgical     Review of Systems   Constitutional:  Negative for chills, fever and unexpected weight change.   HENT:  Negative for congestion, rhinorrhea, sneezing and sore throat.    Eyes:  Negative for redness and visual disturbance.   Respiratory:  Positive for chest tightness. Negative for cough, shortness of breath and wheezing.    Cardiovascular:  Negative for chest pain and leg swelling.   Gastrointestinal:  Negative for abdominal pain, blood in stool, constipation and diarrhea.   Genitourinary:  Negative for dysuria and hematuria.   Musculoskeletal:  Negative for arthralgias and joint swelling.   Skin:  Negative for color change and pallor.   Neurological:  Positive for numbness and headaches. Negative for light-headedness.   Psychiatric/Behavioral:  Negative for agitation and confusion.    Objective:     Vital Signs (Most Recent):  Temp: 97.5 °F (36.4 °C) (04/23/23 1126)  Pulse: 98 (04/23/23 1147)  Resp: 20 (04/23/23 1126)  BP: (!) 143/74 (04/23/23 1126)  SpO2: 96 % (04/23/23 1126)   Vital Signs (24h Range):  Temp:  [97.5 °F (36.4 °C)-98 °F (36.7 °C)] 97.5 °F (36.4 °C)  Pulse:  [] 98  Resp:  [20-23] 20  SpO2:  [94 %-98 %] 96 %  BP: (121-164)/(61-94) 143/74     Weight: 121.2 kg (267 lb 3.2 oz)  Body mass index is 45.86 kg/m².    Physical Exam  Vitals and nursing note reviewed.   Constitutional:       Appearance: Normal appearance.   HENT:      Head: Normocephalic and atraumatic.      Right Ear: Tympanic membrane normal.      Left Ear: Tympanic membrane normal.      Mouth/Throat:      Mouth: Mucous membranes are moist.      Pharynx: Oropharynx is clear.   Eyes:      Extraocular Movements: Extraocular movements intact.      Pupils: Pupils are equal, round, and reactive to light.   Cardiovascular:      Rate and Rhythm: Normal rate and regular rhythm.      Pulses:  Normal pulses.      Heart sounds: Normal heart sounds.   Pulmonary:      Effort: Pulmonary effort is normal.      Breath sounds: Normal breath sounds.   Abdominal:      General: Abdomen is flat.      Palpations: Abdomen is soft.   Musculoskeletal:         General: Normal range of motion.      Cervical back: Normal range of motion.   Skin:     General: Skin is warm.   Neurological:      Mental Status: She is alert and oriented to person, place, and time.      Comments: Left sided facial numbness, no weakness appreciated    Psychiatric:         Mood and Affect: Mood normal.         CRANIAL NERVES     CN III, IV, VI   Pupils are equal, round, and reactive to light.    CN V   Right facial sensation deficit: none  Left facial sensation deficit: cheeks and mandible    CN VII   Right facial weakness: none  Left facial weakness: none    CN XI   Right sternocleidomastoid strength: normal  Left sternocleidomastoid strength: normal  Right trapezius strength: normal  Left trapezius strength: normal    CN XII   Tongue: not atrophic  Tongue deviation: none     Significant Labs: All pertinent labs within the past 24 hours have been reviewed.  CBC:   Recent Labs   Lab 04/22/23  1828   WBC 11.40   HGB 15.6   HCT 45.4        CMP:   Recent Labs   Lab 04/22/23  1828      K 3.7      CO2 29   *   BUN 11   CREATININE 0.97   CALCIUM 9.1   PROT 6.7   ALBUMIN 3.9   BILITOT 0.5   ALKPHOS 90   AST 28   ALT 31   ANIONGAP 7*       Significant Imaging: I have reviewed all pertinent imaging results/findings within the past 24 hours.

## 2023-04-23 NOTE — ASSESSMENT & PLAN NOTE
Endorsed improving, 2 day history of left-sided numbness of the face  CT at outside ED negative    Plan:   -MRI, CTA head and neck   -consult PT OT SLP   -consult Neurology appreciate recs   -already on Xarelto for chronic PE, allergy to aspirin  -continue atorvastatin 40 mg   -thiamine, B12

## 2023-04-23 NOTE — HPI
49 yo female with pmh of schizophrenia, DVT, coronary artery disease pulmonary embolus who presented to [facility] emergency department with left-sided numbness and dizziness. Symptoms started 4/21 at 7 pm and persisted into 4/22 am. Pt also initially endorsed headache.  Also endorsed left-sided chest tightness and shortness of breath.  Patient has been adherent to all her medications only recent medication change was starting Seroquel and discontinuing trazodone 2 weeks ago for her insomnia which has improved.  Denies any weakness, vision changes, change in speech and has had similar episodes of dizziness before.    In outside ED patient's headache improved, CT head did not reveal any acute intracranial findings.  Patient was transferred to [facility] for neurologic evaluation and MRI which were unavailable at [facility].  [facility] was consulted for admission for patient's left-sided numbness potentially secondary to stroke neurological workup.

## 2023-04-23 NOTE — H&P
"[facility] Medicine  History & Physical    Patient Name: Rose Sparks  MRN: [id number]  Patient Class: OP- Observation  Admission Date: 4/23/2023  Attending Physician: Dorina Hobbs MD   Primary Care Provider: Start Robe         Patient information was obtained from patient and ER records.     Subjective:     Principal Problem:Numbness and tingling of left side of face    Chief Complaint: No chief complaint on file.       HPI: 49 yo female with pmh of schizophrenia, DVT, coronary artery disease pulmonary embolus who presented to [facility] emergency department with left-sided numbness and dizziness. Symptoms started 4/21 at 7 pm and persisted into 4/22 am. Pt also initially endorsed headache.  Also endorsed left-sided chest tightness and shortness of breath.  Patient has been adherent to all her medications only recent medication change was starting Seroquel and discontinuing trazodone 2 weeks ago for her insomnia which has improved.  Denies any weakness, vision changes, change in speech and has had similar episodes of dizziness before.    In outside ED patient's headache improved, CT head did not reveal any acute intracranial findings.  Patient was transferred to [facility] for neurologic evaluation and MRI which were unavailable at [facility].  [facility] family Medicine was consulted for admission for patient's left-sided numbness potentially secondary to stroke neurological workup.              Past Medical History:   Diagnosis Date    Abnormal uterine bleeding     Adjustment disorder 03/02/2020    Patient stated that the divorce issues "still linger within" her soul. Patient stated she is grateful she had no children.     Alcohol abuse 02/09/2022    Patient stated that she does occasionally use alcohol in excess.     Anticoagulant long-term use     Anxiety 2016    Patient stated that she went to the [facility] six years ago for her anxiety disorder.     Arthritis     " "Asthma     Bipolar 1 disorder     Chronic pain     Depression 2016    Patient stated that her depression and anxiety have been occurring around the same time.     DVT (deep venous thrombosis)     GERD without esophagitis 3/23/2016    H/O bilateral salpingectomy     History of psychiatric hospitalization 2020    Patient stated that she thinks she was last hospitalized aound .     Hx of psychiatric care 2016    Patient stated she has been non-complaint with her psychiatric medicaitons.     Hypertension     Insomnia     Kidney stone     Migraine headache     Non-occlusive coronary artery disease 2020    Psychiatric problem 2016    Patient stated that she would agree with the statement that she "Numbs" things out.     Pulmonary embolism     Schizoaffective disorder     Schizophrenia     Seizures     "few years ago"    Status post laparoscopic assisted vaginal hysterectomy (LAVH)     with BSO    Substance abuse     Suicide attempt 2020    Patient stated that she does not remember the method or plan when she last attempted suicide; says that she did not go to the hospital for the suicide attempt though; thinks it maight have involved a drug overdose on her prescribed medications.      Therapy 2016    Patient has been attending the [facility] and is in their case management program.     Tobacco abuse 2015       Past Surgical History:   Procedure Laterality Date    CERVICAL BIOPSY  W/ LOOP ELECTRODE EXCISION       SECTION  ,     CHOLECYSTECTOMY      COLONOSCOPY N/A 2022    Procedure: COLONOSCOPY;  Surgeon: Cristiane Chavez MD;  Location: [facility];  Service: Endoscopy;  Laterality: N/A;    CYSTOSCOPY W/ URETERAL STENT PLACEMENT Left 2019    Procedure: CYSTOSCOPY, WITH URETERAL STENT INSERTION;  Surgeon: Beto Sawant MD;  Location: [facility];  Service: Urology;  Laterality: Left;    CYSTOSCOPY WITH URETEROSCOPY, RETROGRADE " PYELOGRAPHY, AND INSERTION OF STENT Left 7/8/2019    Procedure: CYSTOSCOPY, WITH RETROGRADE PYELOGRAM AND URETERAL STENT INSERTION;  Surgeon: Rich García II, MD;  Location: [facility];  Service: Urology;  Laterality: Left;    ENDOMETRIAL ABLATION      ESOPHAGOGASTRODUODENOSCOPY N/A 5/19/2021    Procedure: EGD (ESOPHAGOGASTRODUODENOSCOPY);  Surgeon: Cristiane Chavez MD;  Location: [facility];  Service: Endoscopy;  Laterality: N/A;    ESOPHAGOGASTRODUODENOSCOPY N/A 5/30/2022    Procedure: EGD (ESOPHAGOGASTRODUODENOSCOPY);  Surgeon: Cristiane Chavez MD;  Location: [facility];  Service: Endoscopy;  Laterality: N/A;    HYSTERECTOMY      KIDNEY SURGERY      KNEE SURGERY Left     Replacement of left knee cap    LAPAROSCOPIC CHOLECYSTECTOMY N/A 6/23/2020    Procedure: CHOLECYSTECTOMY, LAPAROSCOPIC;  Surgeon: Luis Bogran-Reyes, MD;  Location: [facility];  Service: General;  Laterality: N/A;    LASER LITHOTRIPSY Left 7/8/2019    Procedure: LITHOTRIPSY, USING LASER;  Surgeon: Rich García II, MD;  Location: [facility];  Service: Urology;  Laterality: Left;    LEFT HEART CATHETERIZATION Left 6/15/2020    Procedure: Left heart cath;  Surgeon: Oseas Olson MD;  Location: [facility] CATH LAB;  Service: Cardiology;  Laterality: Left;    PHLEBOGRAPHY N/A 5/24/2021    Procedure: Venogram;  Surgeon: Eliel Perez MD;  Location: [facility] CATH;  Service: Cardiovascular;  Laterality: N/A;    TONSILLECTOMY, ADENOIDECTOMY      TUBAL LIGATION  2001    URETEROSCOPY Left 7/8/2019    Procedure: URETEROSCOPY;  Surgeon: Rich García II, MD;  Location: [facility];  Service: Urology;  Laterality: Left;    WISDOM TOOTH EXTRACTION         Review of patient's allergies indicates:   Allergen Reactions    Penicillins Anaphylaxis    Aspirin Itching    Aspirin, buffered     Remeron [mirtazapine]     Tramadol Itching     swelling    Ibuprofen Itching    Toradol [ketorolac] Rash       Current Facility-Administered Medications on File  Prior to Encounter   Medication    [COMPLETED] diphenhydrAMINE injection 25 mg    [COMPLETED] droPERidol injection 1.25 mg    [COMPLETED] ondansetron injection 4 mg     Current Outpatient Medications on File Prior to Encounter   Medication Sig    albuterol (ACCUNEB) 1.25 mg/3 mL Nebu Take 1.25 mg by nebulization every 6 (six) hours as needed. Rescue    atorvastatin (LIPITOR) 40 MG tablet Take 40 mg by mouth once daily.    busPIRone (BUSPAR) 10 MG tablet Take 10 mg by mouth 3 (three) times daily.    cetirizine (ZYRTEC) 10 MG tablet Take 10 mg by mouth Daily.    clonazePAM (KLONOPIN) 0.5 MG tablet Take 0.5 mg by mouth 2 (two) times daily as needed for Anxiety.    cloNIDine (CATAPRES) 0.2 MG tablet Take 0.2 mg by mouth every evening.    cyclobenzaprine (FLEXERIL) 10 MG tablet Take 1 tablet (10 mg total) by mouth 3 (three) times daily as needed for Muscle spasms.    fluticasone propionate (FLONASE) 50 mcg/actuation nasal spray 1 spray (50 mcg total) by Each Nostril route 2 (two) times daily as needed for Rhinitis.    hydrocortisone 2.5 % cream Apply topically 2 (two) times daily.    hydrOXYzine pamoate (VISTARIL) 25 MG Cap Take 1 capsule (25 mg total) by mouth every 6 (six) hours as needed (itching).    INVEGA SUSTENNA 234 mg/1.5 mL Syrg injection Inject 234 mg into the muscle once.    isosorbide dinitrate (ISORDIL) 30 MG Tab Take 30 mg by mouth Daily.    metoprolol tartrate (LOPRESSOR) 25 MG tablet Take 25 mg by mouth Daily.    OXcarbazepine (TRILEPTAL) 600 MG Tab Take 600 mg by mouth 2 (two) times daily.    QUEtiapine (SEROQUEL) 400 MG tablet Take 400 mg by mouth every evening.    rivaroxaban (XARELTO) 20 mg Tab Take 1 tablet (20 mg total) by mouth once daily.    SYMBICORT 80-4.5 mcg/actuation HFAA Inhale 2 puffs into the lungs 2 (two) times a day.     valsartan-hydrochlorothiazide (DIOVAN-HCT) 80-12.5 mg per tablet Take 1 tablet by mouth.    [DISCONTINUED] DULoxetine (CYMBALTA) 60 MG capsule  Take 60 mg by mouth 2 (two) times daily.    [DISCONTINUED] famotidine (PEPCID) 20 MG tablet Take 1 tablet (20 mg total) by mouth 2 (two) times daily.    [DISCONTINUED] gabapentin (NEURONTIN) 300 MG capsule Take 300 mg by mouth.    [DISCONTINUED] haloperidoL (HALDOL) 0.5 MG tablet Take 0.25 mg by mouth every evening.    [DISCONTINUED] nitroGLYCERIN (NITROSTAT) 0.4 MG SL tablet Place 1 tablet (0.4 mg total) under the tongue every 5 (five) minutes as needed.    [DISCONTINUED] pantoprazole (PROTONIX) 40 MG tablet Take 1 tablet (40 mg total) by mouth once daily.    [DISCONTINUED] tamsulosin (FLOMAX) 0.4 mg Cap Take 1 capsule (0.4 mg total) by mouth once daily. for 10 days    [DISCONTINUED] traZODone (DESYREL) 300 MG tablet Take 300 mg by mouth every evening.     Family History       Problem Relation (Age of Onset)    Alcohol abuse Mother, Father    Arthritis Father    Asthma Mother    COPD Father    Cancer Father    Colon cancer Brother    Depression Mother    Drug abuse Mother    Early death Mother    Hearing loss Father    Heart disease Father    Hyperlipidemia Mother, Father    Hypertension Father    Learning disabilities Mother    Mental illness Mother    Miscarriages / Stillbirths Mother    No Known Problems Sister    Stroke Father          Tobacco Use    Smoking status: Every Day     Packs/day: 1.50     Years: 25.00     Pack years: 37.50     Types: Cigarettes     Start date: 2/4/1991    Smokeless tobacco: Never   Substance and Sexual Activity    Alcohol use: No     Alcohol/week: 0.0 standard drinks    Drug use: Not Currently     Types: Amphetamines     Comment: denies    Sexual activity: Yes     Partners: Male     Birth control/protection: Surgical     Review of Systems   Constitutional:  Negative for chills, fever and unexpected weight change.   HENT:  Negative for congestion, rhinorrhea, sneezing and sore throat.    Eyes:  Negative for redness and visual disturbance.   Respiratory:  Positive for chest  tightness. Negative for cough, shortness of breath and wheezing.    Cardiovascular:  Negative for chest pain and leg swelling.   Gastrointestinal:  Negative for abdominal pain, blood in stool, constipation and diarrhea.   Genitourinary:  Negative for dysuria and hematuria.   Musculoskeletal:  Negative for arthralgias and joint swelling.   Skin:  Negative for color change and pallor.   Neurological:  Positive for numbness and headaches. Negative for light-headedness.   Psychiatric/Behavioral:  Negative for agitation and confusion.    Objective:     Vital Signs (Most Recent):  Temp: 97.5 °F (36.4 °C) (04/23/23 1126)  Pulse: 98 (04/23/23 1147)  Resp: 20 (04/23/23 1126)  BP: (!) 143/74 (04/23/23 1126)  SpO2: 96 % (04/23/23 1126)   Vital Signs (24h Range):  Temp:  [97.5 °F (36.4 °C)-98 °F (36.7 °C)] 97.5 °F (36.4 °C)  Pulse:  [] 98  Resp:  [20-23] 20  SpO2:  [94 %-98 %] 96 %  BP: (121-164)/(61-94) 143/74     Weight: 121.2 kg (267 lb 3.2 oz)  Body mass index is 45.86 kg/m².    Physical Exam  Vitals and nursing note reviewed.   Constitutional:       Appearance: Normal appearance.   HENT:      Head: Normocephalic and atraumatic.      Right Ear: Tympanic membrane normal.      Left Ear: Tympanic membrane normal.      Mouth/Throat:      Mouth: Mucous membranes are moist.      Pharynx: Oropharynx is clear.   Eyes:      Extraocular Movements: Extraocular movements intact.      Pupils: Pupils are equal, round, and reactive to light.   Cardiovascular:      Rate and Rhythm: Normal rate and regular rhythm.      Pulses: Normal pulses.      Heart sounds: Normal heart sounds.   Pulmonary:      Effort: Pulmonary effort is normal.      Breath sounds: Normal breath sounds.   Abdominal:      General: Abdomen is flat.      Palpations: Abdomen is soft.   Musculoskeletal:         General: Normal range of motion.      Cervical back: Normal range of motion.   Skin:     General: Skin is warm.   Neurological:      Mental Status: She is  alert and oriented to person, place, and time.      Comments: Left sided facial numbness, no weakness appreciated    Psychiatric:         Mood and Affect: Mood normal.         CRANIAL NERVES     CN III, IV, VI   Pupils are equal, round, and reactive to light.    CN V   Right facial sensation deficit: none  Left facial sensation deficit: cheeks and mandible    CN VII   Right facial weakness: none  Left facial weakness: none    CN XI   Right sternocleidomastoid strength: normal  Left sternocleidomastoid strength: normal  Right trapezius strength: normal  Left trapezius strength: normal    CN XII   Tongue: not atrophic  Tongue deviation: none     Significant Labs: All pertinent labs within the past 24 hours have been reviewed.  CBC:   Recent Labs   Lab 04/22/23 1828   WBC 11.40   HGB 15.6   HCT 45.4        CMP:   Recent Labs   Lab 04/22/23  1828      K 3.7      CO2 29   *   BUN 11   CREATININE 0.97   CALCIUM 9.1   PROT 6.7   ALBUMIN 3.9   BILITOT 0.5   ALKPHOS 90   AST 28   ALT 31   ANIONGAP 7*       Significant Imaging: I have reviewed all pertinent imaging results/findings within the past 24 hours.    Assessment/Plan:     * Numbness and tingling of left side of face  Endorsed improving, 2 day history of left-sided numbness of the face  CT at outside ED negative    Plan:   -MRI, CTA head and neck   -consult PT OT SLP   -consult Neurology appreciate recs   -already on Xarelto for chronic PE, allergy to aspirin  -continue atorvastatin 40 mg   -thiamine, B12      Schizophrenia  Continue home Trileptal, Seroquel, last got Invega the beginning of this month      Asthma  On albuterol and Symbicort     Continue albuterol while inpatient as needed    Bipolar 1 disorder        Anxiety and depression  Continue home hydroxyzine, BuSpar, Klonopin      Recurrent pulmonary embolism  Continue home Xarelto      Primary insomnia  Continue home Seroquel        VTE Risk Mitigation (From admission, onward)          Ordered     rivaroxaban tablet 20 mg  Daily         04/23/23 1237     Reason for No Pharmacological VTE Prophylaxis  Once        Question:  Reasons:  Answer:  Already adequately anticoagulated on oral Anticoagulants    04/23/23 1237     IP VTE HIGH RISK PATIENT  Once         04/23/23 1237     Place sequential compression device  Until discontinued         04/23/23 1237                   Adams Hernandez MD  Department of Hospital Medicine  [facility]

## 2023-04-23 NOTE — PROGRESS NOTES
Pt arrived to unit. Introduced self as VN for this shift. Admission questions completed by VN. Educated pt on VTE risk, safety precautions, and VN's role in pt care. Opportunity given for pt's questions. All questions answered.      04/23/23 1450   Admission   Initial VN Admission Questions Complete   Communication Issues? None   Shift   Virtual Nurse - Patient Verbalized Approval Of Camera Use   Type of Frequent Check   Type Other (see comments)  (Admission)   Safety/Activity   Patient Rounds bed in low position;placement of personal items at bedside;visualized patient;call light in patient/parent reach   Safety Promotion/Fall Prevention assistive device/personal item within reach;Fall Risk reviewed with patient/family;side rails raised x 2;instructed to call staff for mobility   Positioning   Body Position position changed independently   Head of Bed (HOB) Positioning HOB at 30-45 degrees   Pain/Comfort/Sleep   Preferred Pain Scale number (Numeric Rating Pain Scale)   Pain Rating (0-10): Rest 0

## 2023-04-23 NOTE — CONSULTS
[facility] NEUROLOGY CONSULT  EVALUATION    Reason for consult:  Stroke  Informant:  Nursing  Other sources of information : Patient    CC:Numbness and tingling of left side of face       HPI: Rose Sparks is a 48 y.o. female with  has a past medical history of Alcohol abuse, Anticoagulant long-term use for DVT and PE,  Bipolar 1 disorder, Non-occlusive coronary artery disease (6/30/2020),  Seizures, Tobacco abuse (2/4/2015). who presents for dizziness and numbness of left sided of face since previous evening.     Symptoms started 4/21 at 7 pm and persisted into 4/22 am. Pt also initially endorsed headache.  Also endorsed left-sided chest tightness and shortness of breath.  Patient has been adherent to all her medications only recent medication change was starting Seroquel and discontinuing trazodone 2 weeks ago for her insomnia which has improved.  Denies any weakness, vision changes, change in speech and has had similar episodes of dizziness before.     In outside ED patient's headache improved, CT head did not reveal any acute intracranial findings.  Patient was transferred to [facility] for neurologic evaluation and MRI which were unavailable at [facility].       ROS:   12pt ROS negative other then mentioned above    Histories:     Allergies:  Penicillins; Aspirin; Aspirin, buffered; Remeron [mirtazapine]; Tramadol; Ibuprofen; and Toradol [ketorolac]    Current Medications:    Current Facility-Administered Medications   Medication Dose Route Frequency Provider Last Rate Last Admin    albuterol inhaler 2 puff  2 puff Inhalation Q6H PRN Adams Hernandez MD        atorvastatin tablet 40 mg  40 mg Oral Daily Adams Hernandez MD   40 mg at 04/23/23 1340    busPIRone tablet 10 mg  10 mg Oral TID Adams Hernandez MD        cetirizine tablet 10 mg  10 mg Oral Daily Adams Hernandez MD        clonazePAM tablet 0.5 mg  0.5 mg Oral BID PRN Adams Hernandez MD        cloNIDine tablet 0.2 mg  0.2 mg Oral QHS Adams Hernandez MD        dextrose 10%  "bolus 125 mL 125 mL  12.5 g Intravenous PRN Adams Hernandez MD        dextrose 10% bolus 250 mL 250 mL  25 g Intravenous PRN Adams Hernandez MD        dextrose 40 % gel 15,000 mg  15 g Oral PRN Adams Hernandez MD        dextrose 40 % gel 30,000 mg  30 g Oral PRN Adams Hernandez MD        hydroCHLOROthiazide tablet 12.5 mg  12.5 mg Oral Daily Adams Hernandez MD   12.5 mg at 04/23/23 1340    hydrOXYzine pamoate capsule 50 mg  50 mg Oral QHS Adams Hernandez MD        labetaloL injection 10 mg  10 mg Intravenous Q15 Min PRN Adams Hernandez MD        metoprolol tartrate (LOPRESSOR) tablet 25 mg  25 mg Oral Daily Adams Hernandez MD        OXcarbazepine tablet 600 mg  600 mg Oral BID Adams Hernandez MD        QUEtiapine tablet 400 mg  400 mg Oral QHS Adams Hernandez MD        rivaroxaban tablet 20 mg  20 mg Oral Daily Adams Hernandez MD   20 mg at 04/23/23 1340    sodium chloride 0.9% flush 10 mL  10 mL Intravenous PRN Adams Hernandez MD        valsartan tablet 80 mg  80 mg Oral BID Adams Hernandez MD             Past Medical/Surgical/Family/Social History:  PMHx:   Past Medical History:   Diagnosis Date    Abnormal uterine bleeding     Adjustment disorder 03/02/2020    Patient stated that the divorce issues "still linger within" her soul. Patient stated she is grateful she had no children.     Alcohol abuse 02/09/2022    Patient stated that she does occasionally use alcohol in excess.     Anticoagulant long-term use     Anxiety 2016    Patient stated that she went to the [facility] six years ago for her anxiety disorder.     Arthritis     Asthma     Bipolar 1 disorder     Chronic pain     Depression 2016    Patient stated that her depression and anxiety have been occurring around the same time.     DVT (deep venous thrombosis)     GERD without esophagitis 3/23/2016    H/O bilateral salpingectomy     History of psychiatric hospitalization 02/01/2020    Patient stated that she thinks she was last hospitalized aound 2020.     Hx of psychiatric care 2016    Patient " "stated she has been non-complaint with her psychiatric medicaitons.     Hypertension     Insomnia     Kidney stone     Migraine headache     Non-occlusive coronary artery disease 2020    Psychiatric problem 2016    Patient stated that she would agree with the statement that she "Numbs" things out.     Pulmonary embolism     Schizoaffective disorder     Schizophrenia     Seizures     "few years ago"    Status post laparoscopic assisted vaginal hysterectomy (LAVH)     with BSO    Substance abuse     Suicide attempt 2020    Patient stated that she does not remember the method or plan when she last attempted suicide; says that she did not go to the hospital for the suicide attempt though; thinks it maight have involved a drug overdose on her prescribed medications.      Therapy 2016    Patient has been attending the [facility] and is in their case management program.     Tobacco abuse 2015      Surgeries:   Past Surgical History:   Procedure Laterality Date    CERVICAL BIOPSY  W/ LOOP ELECTRODE EXCISION       SECTION  ,     CHOLECYSTECTOMY      COLONOSCOPY N/A 2022    Procedure: COLONOSCOPY;  Surgeon: Cristiane Chavez MD;  Location: [facility];  Service: Endoscopy;  Laterality: N/A;    CYSTOSCOPY W/ URETERAL STENT PLACEMENT Left 2019    Procedure: CYSTOSCOPY, WITH URETERAL STENT INSERTION;  Surgeon: Beto Sawant MD;  Location: [facility];  Service: Urology;  Laterality: Left;    CYSTOSCOPY WITH URETEROSCOPY, RETROGRADE PYELOGRAPHY, AND INSERTION OF STENT Left 2019    Procedure: CYSTOSCOPY, WITH RETROGRADE PYELOGRAM AND URETERAL STENT INSERTION;  Surgeon: Rich García II, MD;  Location: [facility];  Service: Urology;  Laterality: Left;    ENDOMETRIAL ABLATION      ESOPHAGOGASTRODUODENOSCOPY N/A 2021    Procedure: EGD (ESOPHAGOGASTRODUODENOSCOPY);  Surgeon: Cristiane Chavez MD;  Location: [facility];  Service: Endoscopy;  Laterality: N/A;    " ESOPHAGOGASTRODUODENOSCOPY N/A 5/30/2022    Procedure: EGD (ESOPHAGOGASTRODUODENOSCOPY);  Surgeon: Cristiane Chavez MD;  Location: [facility];  Service: Endoscopy;  Laterality: N/A;    HYSTERECTOMY      KIDNEY SURGERY      KNEE SURGERY Left     Replacement of left knee cap    LAPAROSCOPIC CHOLECYSTECTOMY N/A 6/23/2020    Procedure: CHOLECYSTECTOMY, LAPAROSCOPIC;  Surgeon: Luis Bogran-Reyes, MD;  Location: [facility];  Service: General;  Laterality: N/A;    LASER LITHOTRIPSY Left 7/8/2019    Procedure: LITHOTRIPSY, USING LASER;  Surgeon: Rich Gacría II, MD;  Location: [facility];  Service: Urology;  Laterality: Left;    LEFT HEART CATHETERIZATION Left 6/15/2020    Procedure: Left heart cath;  Surgeon: Oseas Olson MD;  Location: [facility] C[facility] LAB;  Service: Cardiology;  Laterality: Left;    PHLEBOGRAPHY N/A 5/24/2021    Procedure: Venogram;  Surgeon: Eliel Perez MD;  Location: [facility] CATH;  Service: Cardiovascular;  Laterality: N/A;    TONSILLECTOMY, ADENOIDECTOMY      TUBAL LIGATION  2001    URETEROSCOPY Left 7/8/2019    Procedure: URETEROSCOPY;  Surgeon: Rich García II, MD;  Location: [facility];  Service: Urology;  Laterality: Left;    WISDOM TOOTH EXTRACTION        Family  Hx:   Family History   Problem Relation Age of Onset    Alcohol abuse Mother     Asthma Mother     Depression Mother     Drug abuse Mother     Early death Mother     Hyperlipidemia Mother     Learning disabilities Mother     Mental illness Mother     Miscarriages / Stillbirths Mother     Cancer Father     Alcohol abuse Father     Arthritis Father     COPD Father     Hearing loss Father     Heart disease Father     Hyperlipidemia Father     Hypertension Father     Stroke Father     No Known Problems Sister     Colon cancer Brother       Social Hx:   Social History     Tobacco Use    Smoking status: Every Day     Packs/day: 1.50     Years: 25.00     Pack years: 37.50     Types: Cigarettes     Start date: 2/4/1991    Smokeless tobacco: Never    Substance Use Topics    Alcohol use: No     Alcohol/week: 0.0 standard drinks    Drug use: Not Currently     Types: Amphetamines     Comment: denies         Current Evaluation:     Vital Signs:   Vitals:    04/23/23 1147   BP:    Pulse: 98   Resp:    Temp:         General Exam  No apparent distress  Orientation  Alert, awake, oriented to self, place, time, and situation.  Memory  Recent and remote memory intact.  Language  Fluent speech. No dysarthria, No aphasia.   Cranial Nerves  PERRL, VF intact, EOMI, V1-V3 intact, symmetric facial expression, hearing grossly intact, SCM & TPZ 5/5, tongue midline, symmetric palate elevation.  Motor  Normal Bulk, Normal Tone  Right Upper Extremity: Normal 5/5 strength  Left Upper Extremity: Normal 5/5 strength  Right Lower Extremity: Normal 5/5 strength  Left Lower Extremity: Normal 5/5 strength  Sensory  Decreased sensation to left face   Normal vibration   DTR  Upper Extremities:  +2/4, symmetric  Lower Extremities: Patellar and Achilles +2/4 and symmetric  Cerebellar/Gait  Normal finger to nose and heel to shin.   Normal gait and stance.       LABORATORY STUDIES:  Labs:  Recent Labs   Lab 04/22/23  1828   WBC 11.40   HGB 15.6   HCT 45.4      MCV 98       Recent Labs   Lab 04/22/23  1828      K 3.7      CO2 29   BUN 11   *   CALCIUM 9.1   PROT 6.7   ALBUMIN 3.9   BILITOT 0.5   AST 28   ALKPHOS 90   ALT 31       Recent Labs   Lab 04/22/23  1828   INR 1.3*       Recent Labs   Lab 04/22/23  1828   *       Urine:   Lab Results   Component Value Date    LABURIN No significant growth 08/28/2022    SPECGRAV 1.020 12/26/2022    NITRITE Negative 12/26/2022    KETONESU Negative 12/26/2022    UROBILINOGEN Negative 12/26/2022    RBCU 5 TO 10 02/02/2017    WBCUA 1 12/01/2022       No results for input(s): HGBA1C, GLUF, MICROALBUR, LDLCALC in the last 168 hours.    Thyroid:   No results for input(s): TSH, FREET4, O0NGSYM, V2RDHOZ, THYROIDAB in the last 168  hours.    FLP:   No results for input(s): CHOL, HDL, LDLCALC, TRIG, CHOLHDL in the last 168 hours.    Cardiac markers:  Recent Labs   Lab 23  1828   TROPONINI <0.012       RADIOLOGY STUDIES:    CTH 2023:  There is no evidence for acute intracranial hemorrhage or sulcal effacement.  The ventricles are normal in size and configuration without evidence for hydrocephalus.  There is no midline shift or mass effect.  Allowing for CT technique there is no abnormal parenchymal enhancement.  Few patchy ethmoid air cell opacities with continued hyperdensity within the ethmoid air cells concerning for osteomas     CTA 2023  There is atherosclerotic plaquing of the origin the right brachiocephalic artery without significant stenosis.  The left carotid and left subclavian artery origins are within normal limits.  The origin the vertebral arteries from the respective subclavian arteries are within normal limits.  Questionable stenosis left proximal V1 segment of vertebral artery versus artifact from motion.     NIHSS ([place] [facility] Stroke Scale)   1a  Level of consciousness: 0=alert; keenly responsive   1b. LOC questions:  0 = Answers both questions correctly   1c. LOC commands: 0=Answers both tasks correctly   2.  Best Gaze: 0=normal   3. Visual: 0=No visual loss   4. Facial Palsy: 0=Normal symmetric movement   5a. Motor left arm: 0=No drift, limb holds 90 (or 45) degrees for full 10 seconds   5b.  Motor right arm: 0=No drift, limb holds 90 (or 45) degrees for full 10 seconds   6a. motor left le=No drift, limb holds 90 (or 45) degrees for full 10 seconds   6b  Motor right le=No drift, limb holds 90 (or 45) degrees for full 10 seconds   7. Limb Ataxia: 0=Absent   8.  Sensory: 1=Mild to moderate sensory loss; patient feels pinprick is less sharp or is dull on the affected side; there is a loss of superficial pain with pinprick but patient is aware She is being touched   9. Best Language:  0=No  aphasia, normal   10. Dysarthria: 0=Normal   11. Extinction and Inattention: 0=No abnormality         Total:   1          Assessment/Plan:  P     48 y.o. female with  has a past medical history of Alcohol abuse, Anticoagulant long-term use for DVT and PE,  Bipolar 1 disorder, Non-occlusive coronary artery disease (6/30/2020),  Seizures, Tobacco abuse who presents for dizziness and numbness of left sided of face since previous evening. NIHSS 1 for abnormal left sided facial sensation , otherwise dizziness has resolved. Pending further neuroimaging to rule out acute ischemic stroke.       -Initial NIHSS on Neurology exam: 1   -q4 hour neuro checks  Continuous telemetry  -Imaging: MRI Brain w/o contrast  MRA Head and Neck( due to questionable stenosis of Left Vertebral V1 segment)   -AC/Antiplatelet: Rivaroxaban 20mg daily  -SBP goal <220  Meds per primary service    - Recommend permissive hypertension for the initial 24 to 48 hours of acute ischemic stroke unless the blood pressure is >220 mm Hg systolic or >120 mm Hg diastolic, or there is a concomitant specific medical condition that would benefit from blood pressure lowering (e.g., MI, aortic dissection).  -A1c 5.5  SSI w/ accuchecks per primary  goal glucose 100-180  -  Atorvastatin 40 mg daily   -B12/folate 307  would benefit from B12 repletion   -PT/OT/SLP evaluation and treatment appreciated  -Discussed stroke risk factors and symptoms to recognize   - Continue Trileptal 600mg BID for seizures      Differential diagnosis was explained to the patient. All questions were answered. Patient understood and agreed to adhere to plan.       Will follow for additional input regarding management of current neurologic condition.  Will monitor for any new neurologic signs/symptoms or any neurologic detrimental changes.       Case discussed with Dr. Deleon    Thank you for your consult.    Electronically signed by:   Dari Chen MD   4/23/2023 2:15 PM

## 2023-04-24 ENCOUNTER — CLINICAL SUPPORT (OUTPATIENT)
Dept: SMOKING CESSATION | Facility: CLINIC | Age: 48
End: 2023-04-24
Payer: COMMERCIAL

## 2023-04-24 DIAGNOSIS — F17.210 CIGARETTE SMOKER: Primary | ICD-10-CM

## 2023-04-24 PROBLEM — R56.9 SEIZURES: Status: ACTIVE | Noted: 2023-04-24

## 2023-04-24 LAB
ALBUMIN SERPL BCP-MCNC: 3.3 G/DL (ref 3.5–5.2)
ALP SERPL-CCNC: 79 U/L (ref 55–135)
ALT SERPL W/O P-5'-P-CCNC: 18 U/L (ref 10–44)
ANION GAP SERPL CALC-SCNC: 9 MMOL/L (ref 8–16)
AORTIC ROOT ANNULUS: 3.21 CM
AORTIC VALVE CUSP SEPERATION: 1.98 CM
AST SERPL-CCNC: 11 U/L (ref 10–40)
AV INDEX (PROSTH): 0.73
AV MEAN GRADIENT: 8 MMHG
AV PEAK GRADIENT: 17 MMHG
AV VALVE AREA: 2.51 CM2
AV VELOCITY RATIO: 0.76
BASOPHILS # BLD AUTO: 0.07 K/UL (ref 0–0.2)
BASOPHILS NFR BLD: 0.6 % (ref 0–1.9)
BILIRUB SERPL-MCNC: 0.4 MG/DL (ref 0.1–1)
BILIRUB UR QL STRIP: NEGATIVE
BSA FOR ECHO PROCEDURE: 2.34 M2
BUN SERPL-MCNC: 8 MG/DL (ref 6–20)
CALCIUM SERPL-MCNC: 9.3 MG/DL (ref 8.7–10.5)
CHLORIDE SERPL-SCNC: 102 MMOL/L (ref 95–110)
CLARITY UR: CLEAR
CO2 SERPL-SCNC: 26 MMOL/L (ref 23–29)
COLOR UR: YELLOW
CREAT SERPL-MCNC: 1 MG/DL (ref 0.5–1.4)
CV ECHO LV RWT: 0.48 CM
DIFFERENTIAL METHOD: ABNORMAL
DOP CALC AO PEAK VEL: 2.05 M/S
DOP CALC AO VTI: 26.8 CM
DOP CALC LVOT AREA: 3.4 CM2
DOP CALC LVOT DIAMETER: 2.09 CM
DOP CALC LVOT PEAK VEL: 1.56 M/S
DOP CALC LVOT STROKE VOLUME: 67.21 CM3
DOP CALC MV VTI: 19.9 CM
DOP CALCLVOT PEAK VEL VTI: 19.6 CM
E WAVE DECELERATION TIME: 177.58 MSEC
E/A RATIO: 0.73
E/E' RATIO: 15.4 M/S
ECHO LV POSTERIOR WALL: 1.11 CM (ref 0.6–1.1)
EJECTION FRACTION: 65 %
EOSINOPHIL # BLD AUTO: 0.1 K/UL (ref 0–0.5)
EOSINOPHIL NFR BLD: 1.2 % (ref 0–8)
ERYTHROCYTE [DISTWIDTH] IN BLOOD BY AUTOMATED COUNT: 13.5 % (ref 11.5–14.5)
EST. GFR  (NO RACE VARIABLE): >60 ML/MIN/1.73 M^2
FRACTIONAL SHORTENING: 35 % (ref 28–44)
GLUCOSE SERPL-MCNC: 110 MG/DL (ref 70–110)
GLUCOSE UR QL STRIP: NEGATIVE
HCT VFR BLD AUTO: 47.4 % (ref 37–48.5)
HGB BLD-MCNC: 16.2 G/DL (ref 12–16)
HGB UR QL STRIP: ABNORMAL
IMM GRANULOCYTES # BLD AUTO: 0.05 K/UL (ref 0–0.04)
IMM GRANULOCYTES NFR BLD AUTO: 0.4 % (ref 0–0.5)
INTERVENTRICULAR SEPTUM: 1.01 CM (ref 0.6–1.1)
IVRT: 70.41 MSEC
KETONES UR QL STRIP: NEGATIVE
LEFT ATRIUM SIZE: 3.52 CM
LEFT INTERNAL DIMENSION IN SYSTOLE: 2.97 CM (ref 2.1–4)
LEFT VENTRICLE DIASTOLIC VOLUME INDEX: 43.81 ML/M2
LEFT VENTRICLE DIASTOLIC VOLUME: 96.82 ML
LEFT VENTRICLE MASS INDEX: 78 G/M2
LEFT VENTRICLE SYSTOLIC VOLUME INDEX: 15.5 ML/M2
LEFT VENTRICLE SYSTOLIC VOLUME: 34.19 ML
LEFT VENTRICULAR INTERNAL DIMENSION IN DIASTOLE: 4.59 CM (ref 3.5–6)
LEFT VENTRICULAR MASS: 171.5 G
LEUKOCYTE ESTERASE UR QL STRIP: NEGATIVE
LV LATERAL E/E' RATIO: 15.4 M/S
LV SEPTAL E/E' RATIO: 15.4 M/S
LVOT MG: 4.76 MMHG
LVOT MV: 0.99 CM/S
LYMPHOCYTES # BLD AUTO: 2.8 K/UL (ref 1–4.8)
LYMPHOCYTES NFR BLD: 25.1 % (ref 18–48)
MAGNESIUM SERPL-MCNC: 2 MG/DL (ref 1.6–2.6)
MCH RBC QN AUTO: 33.1 PG (ref 27–31)
MCHC RBC AUTO-ENTMCNC: 34.2 G/DL (ref 32–36)
MCV RBC AUTO: 97 FL (ref 82–98)
MONOCYTES # BLD AUTO: 0.8 K/UL (ref 0.3–1)
MONOCYTES NFR BLD: 7 % (ref 4–15)
MV MEAN GRADIENT: 2 MMHG
MV PEAK A VEL: 1.06 M/S
MV PEAK E VEL: 0.77 M/S
MV PEAK GRADIENT: 5 MMHG
MV STENOSIS PRESSURE HALF TIME: 51.5 MS
MV VALVE AREA BY CONTINUITY EQUATION: 3.38 CM2
MV VALVE AREA P 1/2 METHOD: 4.27 CM2
NEUTROPHILS # BLD AUTO: 7.4 K/UL (ref 1.8–7.7)
NEUTROPHILS NFR BLD: 65.7 % (ref 38–73)
NITRITE UR QL STRIP: NEGATIVE
NRBC BLD-RTO: 0 /100 WBC
PH UR STRIP: 7 [PH] (ref 5–8)
PHOSPHATE SERPL-MCNC: 3.8 MG/DL (ref 2.7–4.5)
PISA TR MAX VEL: 1.63 M/S
PLATELET # BLD AUTO: 200 K/UL (ref 150–450)
PMV BLD AUTO: 8.8 FL (ref 9.2–12.9)
POTASSIUM SERPL-SCNC: 3.9 MMOL/L (ref 3.5–5.1)
PROT SERPL-MCNC: 6.5 G/DL (ref 6–8.4)
PROT UR QL STRIP: NEGATIVE
PULM VEIN S/D RATIO: 1.67
PV PEAK D VEL: 0.27 M/S
PV PEAK S VEL: 0.45 M/S
RA PRESSURE: 3 MMHG
RBC # BLD AUTO: 4.9 M/UL (ref 4–5.4)
RIGHT VENTRICULAR END-DIASTOLIC DIMENSION: 2.28 CM
SODIUM SERPL-SCNC: 137 MMOL/L (ref 136–145)
SP GR UR STRIP: 1.02 (ref 1–1.03)
TDI LATERAL: 0.05 M/S
TDI SEPTAL: 0.05 M/S
TDI: 0.05 M/S
TR MAX PG: 11 MMHG
TV REST PULMONARY ARTERY PRESSURE: 14 MMHG
URN SPEC COLLECT METH UR: ABNORMAL
UROBILINOGEN UR STRIP-ACNC: NEGATIVE EU/DL
WBC # BLD AUTO: 11.22 K/UL (ref 3.9–12.7)

## 2023-04-24 PROCEDURE — 83735 ASSAY OF MAGNESIUM: CPT | Performed by: STUDENT IN AN ORGANIZED HEALTH CARE EDUCATION/TRAINING PROGRAM

## 2023-04-24 PROCEDURE — 25000003 PHARM REV CODE 250: Performed by: STUDENT IN AN ORGANIZED HEALTH CARE EDUCATION/TRAINING PROGRAM

## 2023-04-24 PROCEDURE — 81003 URINALYSIS AUTO W/O SCOPE: CPT | Performed by: STUDENT IN AN ORGANIZED HEALTH CARE EDUCATION/TRAINING PROGRAM

## 2023-04-24 PROCEDURE — 25000003 PHARM REV CODE 250: Performed by: FAMILY MEDICINE

## 2023-04-24 PROCEDURE — S4991 NICOTINE PATCH NONLEGEND: HCPCS | Performed by: FAMILY MEDICINE

## 2023-04-24 PROCEDURE — 97161 PT EVAL LOW COMPLEX 20 MIN: CPT

## 2023-04-24 PROCEDURE — 94761 N-INVAS EAR/PLS OXIMETRY MLT: CPT

## 2023-04-24 PROCEDURE — 92610 EVALUATE SWALLOWING FUNCTION: CPT

## 2023-04-24 PROCEDURE — 99407 PR TOBACCO USE CESSATION INTENSIVE >10 MINUTES: ICD-10-PCS | Mod: S$GLB,,,

## 2023-04-24 PROCEDURE — 99407 BEHAV CHNG SMOKING > 10 MIN: CPT | Mod: S$GLB,,,

## 2023-04-24 PROCEDURE — G0378 HOSPITAL OBSERVATION PER HR: HCPCS

## 2023-04-24 PROCEDURE — 84100 ASSAY OF PHOSPHORUS: CPT | Performed by: STUDENT IN AN ORGANIZED HEALTH CARE EDUCATION/TRAINING PROGRAM

## 2023-04-24 PROCEDURE — 80053 COMPREHEN METABOLIC PANEL: CPT | Performed by: STUDENT IN AN ORGANIZED HEALTH CARE EDUCATION/TRAINING PROGRAM

## 2023-04-24 PROCEDURE — 36415 COLL VENOUS BLD VENIPUNCTURE: CPT | Performed by: STUDENT IN AN ORGANIZED HEALTH CARE EDUCATION/TRAINING PROGRAM

## 2023-04-24 PROCEDURE — 63600175 PHARM REV CODE 636 W HCPCS: Performed by: STUDENT IN AN ORGANIZED HEALTH CARE EDUCATION/TRAINING PROGRAM

## 2023-04-24 PROCEDURE — 92523 SPEECH SOUND LANG COMPREHEN: CPT

## 2023-04-24 PROCEDURE — 85025 COMPLETE CBC W/AUTO DIFF WBC: CPT | Performed by: STUDENT IN AN ORGANIZED HEALTH CARE EDUCATION/TRAINING PROGRAM

## 2023-04-24 PROCEDURE — 99900035 HC TECH TIME PER 15 MIN (STAT)

## 2023-04-24 PROCEDURE — 97165 OT EVAL LOW COMPLEX 30 MIN: CPT

## 2023-04-24 RX ORDER — IBUPROFEN 200 MG
1 TABLET ORAL DAILY
Status: DISCONTINUED | OUTPATIENT
Start: 2023-04-24 | End: 2023-04-25 | Stop reason: HOSPADM

## 2023-04-24 RX ORDER — LANOLIN ALCOHOL/MO/W.PET/CERES
1000 CREAM (GRAM) TOPICAL DAILY
Status: DISCONTINUED | OUTPATIENT
Start: 2023-04-24 | End: 2023-04-25 | Stop reason: HOSPADM

## 2023-04-24 RX ORDER — LORAZEPAM 2 MG/ML
2 INJECTION INTRAMUSCULAR ONCE
Status: COMPLETED | OUTPATIENT
Start: 2023-04-24 | End: 2023-04-24

## 2023-04-24 RX ORDER — HYDROXYZINE PAMOATE 25 MG/1
50 CAPSULE ORAL EVERY 8 HOURS PRN
Status: DISCONTINUED | OUTPATIENT
Start: 2023-04-24 | End: 2023-04-25 | Stop reason: HOSPADM

## 2023-04-24 RX ADMIN — LORAZEPAM 2 MG: 2 INJECTION INTRAMUSCULAR; INTRAVENOUS at 04:04

## 2023-04-24 RX ADMIN — ATORVASTATIN CALCIUM 40 MG: 40 TABLET, FILM COATED ORAL at 08:04

## 2023-04-24 RX ADMIN — QUETIAPINE FUMARATE 400 MG: 100 TABLET ORAL at 10:04

## 2023-04-24 RX ADMIN — OXCARBAZEPINE 600 MG: 150 TABLET, FILM COATED ORAL at 10:04

## 2023-04-24 RX ADMIN — OXCARBAZEPINE 600 MG: 150 TABLET, FILM COATED ORAL at 08:04

## 2023-04-24 RX ADMIN — HYDROCHLOROTHIAZIDE 12.5 MG: 12.5 TABLET ORAL at 08:04

## 2023-04-24 RX ADMIN — NICOTINE 1 PATCH: 14 PATCH, EXTENDED RELEASE TRANSDERMAL at 03:04

## 2023-04-24 RX ADMIN — BUSPIRONE HYDROCHLORIDE 10 MG: 5 TABLET ORAL at 10:04

## 2023-04-24 RX ADMIN — HYDROXYZINE PAMOATE 50 MG: 25 CAPSULE ORAL at 10:04

## 2023-04-24 RX ADMIN — METOPROLOL TARTRATE 25 MG: 25 TABLET, FILM COATED ORAL at 04:04

## 2023-04-24 RX ADMIN — BUSPIRONE HYDROCHLORIDE 10 MG: 5 TABLET ORAL at 08:04

## 2023-04-24 RX ADMIN — ISOSORBIDE DINITRATE 30 MG: 10 TABLET ORAL at 04:04

## 2023-04-24 RX ADMIN — BUSPIRONE HYDROCHLORIDE 10 MG: 5 TABLET ORAL at 03:04

## 2023-04-24 RX ADMIN — RIVAROXABAN 20 MG: 20 TABLET, FILM COATED ORAL at 08:04

## 2023-04-24 RX ADMIN — CETIRIZINE HYDROCHLORIDE 10 MG: 10 TABLET, FILM COATED ORAL at 04:04

## 2023-04-24 RX ADMIN — CYANOCOBALAMIN TAB 1000 MCG 1000 MCG: 1000 TAB at 08:04

## 2023-04-24 RX ADMIN — VALSARTAN 80 MG: 40 TABLET, FILM COATED ORAL at 08:04

## 2023-04-24 NOTE — NURSING
Notified [facility] medicine that patient's pulse is between 120-140bpm on the telemetry monitor. When RN went to assess patient there were no complaints. Patient was sitting up in bed on assessment. Patient is afebrile and BP WNL. RN will continue to monitor.

## 2023-04-24 NOTE — PROGRESS NOTES
Individual Follow-Up Form    4/24/2023    Quit Date: To be determined    Clinical Status of Patient: Inpatient    Length of Service: 30 minutes    Comments: Smoking cessation education provided. Pt states that she started smoking at age 16, and that she smokes 1 pk cigarettes every 3 days. She states not ready to quit smoking, and declines referral to Ambulatory Smoking Cessation clinic. Handout provided.    Diagnosis: F17.210

## 2023-04-24 NOTE — PLAN OF CARE
Problem: SLP  Goal: SLP Goal  Outcome: Met  Euvivx-Vklnyrgs-Sycjfbnud Evaluation and Bedside Swallow Study completed. Recommend: regular diet, thin liquids, standard aspiration precautions. No further skilled ST services warranted at this time. Please re-consult as needed.

## 2023-04-24 NOTE — PLAN OF CARE
SW met with pt at bedside to complete assessment. Pt is AxO X3  and able to verbally answer assessment questions. Pt able to confirm demographic information. Pt reports to live at home with her sister Yaritza who is also emergency contact. Pt reports while at home being independent of ADL's and no DME in use. Pt is open to home health but is adamant about returning home to care for her pets. SW met with pt and assigned nurse was at bedside. Pt reports her phone number to be disconnected. Pt reports not driving but her sister to support. SW updated whiteboard with [facility] name and contact information. SW confirmed pt understanding of Observation unit and expected discharge plan. SW will continue to follow pt throughout care and assist with any discharge needs.         04/24/23 1256   Discharge Planning   Assessment Type Discharge Planning Brief Assessment   Resource/Environmental Concerns none   Support Systems Family members;Children   Equipment Currently Used at Home none   Current Living Arrangements home   Patient/Family Anticipates Transition to home with family   Patient/Family Anticipated Services at Transition none   DME Needed Upon Discharge  none   Discharge Plan A Home with family;Home Health       Future Appointments   Date Time Provider Department Center   5/10/2023 11:30 AM KRISHAN SELBY CHA, CSW [facility]  [phone]

## 2023-04-24 NOTE — PLAN OF CARE
"  Problem: Adult Inpatient Plan of Care  Goal: Plan of Care Review  Outcome: Ongoing, Progressing  Goal: Optimal Comfort and Wellbeing  Outcome: Ongoing, Progressing  Goal: Readiness for Transition of Care  Outcome: Ongoing, Progressing     Problem: Adjustment to Illness (Stroke, Ischemic/Transient Ischemic Attack)  Goal: Optimal Coping  Outcome: Ongoing, Progressing     Problem: Cognitive Impairment (Stroke, Ischemic/Transient Ischemic Attack)  Goal: Optimal Cognitive Function  Outcome: Ongoing, Progressing     Problem: Communication Impairment (Stroke, Ischemic/Transient Ischemic Attack)  Goal: Improved Communication Skills  Outcome: Ongoing, Progressing    Patient AxOx4, ORA and running sinus tachycardia on telemetry. Neuro checks are being performed q4. Initially patient c/o numbness and tingling only in the right hand. As the day progressed, patient denied numbness/tingling and reports she is feeling "back to normal". Patient was sent to MRI this AM but could not sit through whole scan. MD ordered IV lorazepam which was given before second attempt. [facility] medicine was notified that patient's HR has been steadily increasing from 120-140 bpm in sinus rhythm. When assessing patient, the only complaint is anxiety. RN also provided stroke/TIA education to patient via packet.      "

## 2023-04-24 NOTE — PLAN OF CARE
Problem: Physical Therapy  Goal: Physical Therapy Goal  Outcome: Met     PT Eval completed, note to follow. Pt ambulated ~200 ft in schreiber with no AD and with Mod I. No LOB. Pt reports symptoms resolved. Pt is functioning near/at her baseline level of function. Recommending d/c home with no DME or therapy needs. D/c PT.

## 2023-04-24 NOTE — PROGRESS NOTES
[facility] Medicine  Progress Note    Patient Name: Rose Sparks  MRN: [id number]  Patient Class: OP- Observation   Admission Date: 4/23/2023  Length of Stay: 0 days  Attending Physician: Dorina Hobbs MD  Primary Care Provider: [facility]        Subjective:     Principal Problem:Numbness and tingling of left side of face    HPI:  47 yo female with pmh of schizophrenia, DVT, coronary artery disease pulmonary embolus who presented to [facility] emergency department with left-sided numbness and dizziness. Symptoms started 4/21 at 7 pm and persisted into 4/22 am. Pt also initially endorsed headache.  Also endorsed left-sided chest tightness and shortness of breath.  Patient has been adherent to all her medications only recent medication change was starting Seroquel and discontinuing trazodone 2 weeks ago for her insomnia which has improved.  Denies any weakness, vision changes, change in speech and has had similar episodes of dizziness before.    In outside ED patient's headache improved, CT head did not reveal any acute intracranial findings.  Patient was transferred to [facility] for neurologic evaluation and MRI which were unavailable at [facility].  [facility] was consulted for admission for patient's left-sided numbness potentially secondary to stroke neurological workup.            Overview/Hospital Course:  No notes on file    Interval History: NAOEN, still awaiting MRI, echo,     Review of Systems   Constitutional:  Negative for chills, fever and unexpected weight change.   HENT:  Negative for congestion, rhinorrhea, sneezing and sore throat.    Eyes:  Negative for redness and visual disturbance.   Respiratory:  Positive for chest tightness. Negative for cough, shortness of breath and wheezing.    Cardiovascular:  Negative for chest pain and leg swelling.   Gastrointestinal:  Negative for abdominal pain, blood in stool, constipation and diarrhea.   Genitourinary:   Negative for dysuria and hematuria.   Musculoskeletal:  Negative for arthralgias and joint swelling.   Skin:  Negative for color change and pallor.   Neurological:  Positive for headaches. Negative for light-headedness and numbness.   Psychiatric/Behavioral:  Negative for agitation and confusion.    Objective:     Vital Signs (Most Recent):  Temp: 97.6 °F (36.4 °C) (04/24/23 0756)  Pulse: 108 (04/24/23 0756)  Resp: 18 (04/24/23 0756)  BP: 132/63 (04/24/23 0756)  SpO2: (!) 94 % (04/24/23 0831)   Vital Signs (24h Range):  Temp:  [97.5 °F (36.4 °C)-98.5 °F (36.9 °C)] 97.6 °F (36.4 °C)  Pulse:  [] 108  Resp:  [16-18] 18  SpO2:  [93 %-97 %] 94 %  BP: (101-139)/(63-87) 132/63     Weight: 121.1 kg (267 lb)  Body mass index is 45.83 kg/m².  No intake or output data in the 24 hours ending 04/24/23 1142   Physical Exam  Vitals and nursing note reviewed.   Constitutional:       Appearance: Normal appearance.   HENT:      Head: Normocephalic and atraumatic.      Right Ear: Tympanic membrane normal.      Left Ear: Tympanic membrane normal.      Mouth/Throat:      Mouth: Mucous membranes are moist.      Pharynx: Oropharynx is clear.   Eyes:      Extraocular Movements: Extraocular movements intact.      Pupils: Pupils are equal, round, and reactive to light.   Cardiovascular:      Rate and Rhythm: Normal rate and regular rhythm.      Pulses: Normal pulses.      Heart sounds: Normal heart sounds.   Pulmonary:      Effort: Pulmonary effort is normal.      Breath sounds: Normal breath sounds.   Abdominal:      General: Abdomen is flat.      Palpations: Abdomen is soft.   Musculoskeletal:         General: Normal range of motion.      Cervical back: Normal range of motion.   Skin:     General: Skin is warm.   Neurological:      Mental Status: She is alert and oriented to person, place, and time.      Comments: Left sided facial numbness resolved , no weakness appreciated    Psychiatric:         Mood and Affect: Mood normal.        Significant Labs: All pertinent labs within the past 24 hours have been reviewed.  CBC:   Recent Labs   Lab 04/22/23  1828 04/24/23  0315   WBC 11.40 11.22   HGB 15.6 16.2*   HCT 45.4 47.4    200       CMP:   Recent Labs   Lab 04/22/23  1828 04/24/23  0315    137   K 3.7 3.9    102   CO2 29 26   * 110   BUN 11 8   CREATININE 0.97 1.0   CALCIUM 9.1 9.3   PROT 6.7 6.5   ALBUMIN 3.9 3.3*   BILITOT 0.5 0.4   ALKPHOS 90 79   AST 28 11   ALT 31 18   ANIONGAP 7* 9         Significant Imaging: I have reviewed all pertinent imaging results/findings within the past 24 hours.     Assessment/Plan:      * Numbness and tingling of left side of face  Endorsed improving, 2 day history of left-sided numbness of the face  CT at outside ED negative    Plan:   -MRI, CTA head and neck   -consult PT OT SLP   -consult Neurology appreciate recs   -already on Xarelto for chronic PE, allergy to aspirin  -continue atorvastatin 40 mg   -thiamine  - replete B12      Seizures  Continue home trileptal       Schizophrenia  Continue home Seroquel, last got Invega the beginning of this month      Asthma  On albuterol and Symbicort     Continue albuterol while inpatient as needed    Bipolar 1 disorder        Anxiety and depression  Continue home hydroxyzine, BuSpar, Klonopin      Recurrent pulmonary embolism  Continue home Xarelto      Primary insomnia  Continue home Seroquel      VTE Risk Mitigation (From admission, onward)           Ordered     rivaroxaban tablet 20 mg  Daily         04/23/23 1237     Reason for No Pharmacological VTE Prophylaxis  Once        Question:  Reasons:  Answer:  Already adequately anticoagulated on oral Anticoagulants    04/23/23 1237     IP VTE HIGH RISK PATIENT  Once         04/23/23 1237     Place sequential compression device  Until discontinued         04/23/23 1237                    Discharge Planning   KATIE:      Code Status: Full Code   Is the patient medically ready for discharge?:      Reason for patient still in hospital (select all that apply): Treatment           Adams Hernandez MD  Department of [facility] Medicine   [facility]

## 2023-04-24 NOTE — PT/OT/SLP EVAL
"Occupational Therapy   Evaluation & Discharge Note    Name: Rose Sparks  MRN: [id number]  Admitting Diagnosis: Numbness and tingling of left side of face  Recent Surgery: * No surgery found *      Recommendations:     Discharge Recommendations: home  Discharge Equipment Recommendations:  none  Barriers to discharge:  None    Assessment:     Rose Sparks is a 48 y.o. female with a medical diagnosis of Numbness and tingling of left side of face.  She presents with Diagnoses of Dizziness and TIA (transient ischemic attack) were pertinent to this visit. Performance deficits affecting function: impaired skin, decreased upper extremity function.      OT eval performed with PT this date, pt agreeable to therapy. Pt reports be at/near her baseline at this time & notes no deficits. Pt performing bed mobility independently & functional mobility Marybeth with no AD. Anticipate pt to d/c to home with 24/7 supervision & no needs. No further acute OT needs noted at this time, d/c OT.     Rehab Prognosis: Good; patient would benefit from acute skilled OT services to address these deficits and reach maximum level of function.       Plan:     Plan of Care Expires: 04/24/23  Plan of Care Reviewed with: patient    Subjective     Chief Complaint: None  Patient/Family Comments/goals: "I feel great."    Occupational Profile:  Living Environment:  Pt lives w/sister, MH, ramp w/BHR, tub/shower combo with SC  Previous level of function: Pt reports independent with no AD for ADLs & functional mobility  Roles and Routines: Pt does not drive  Equipment Used at Home: none  Assistance upon Discharge: Sister who is home    Pain/Comfort:  Pain Rating 1: 0/10    Patients cultural, spiritual, Judaism conflicts given the current situation: no    Objective:     Communicated with: amos prior to session.  Patient found HOB elevated with telemetry upon OT entry to room.    General Precautions: Standard,    Orthopedic Precautions: N/A  Braces: " N/A  Respiratory Status: Room air    Occupational Performance:    Bed Mobility:    Patient completed Scooting/Bridging with independence  Patient completed Supine to Sit with independence    Functional Mobility/Transfers:  Patient completed Sit <> Stand Transfer with modified independence  with  no assistive device   Functional Mobility: Pt performing functional mobility in room/hallway with Marybeth & no AD.    Cognitive/Visual Perceptual:  Cognitive/Psychosocial Skills:     -       Oriented to: Person, Place, Time, and Situation   -       Follows Commands/attention:Follows multistep  commands  -       Memory: No Deficits noted  -       Mood/Affect/Coping skills/emotional control: Cooperative and Pleasant    Physical Exam:  Sensation:    -       Intact  light/touch BUE  Upper Extremity Range of Motion:     -       Right Upper Extremity: WFL  -       Left Upper Extremity: WFL  Upper Extremity Strength:    -       Right Upper Extremity: WFL  -       Left Upper Extremity: WFL   Strength:    -       Right Upper Extremity: Decreased 2/2 dog bite on R hand near 5th digit  -       Left Upper Extremity: WFL  Fine Motor Coordination:    -       Intact  Left hand thumb/finger opposition skills, Right hand thumb/finger opposition skills (except thumb to 5th digit 2/2 dog bite), Left hand, diadochokinesis skill , and Right hand, diadochokinesis skill     AMPAC 6 Click ADL:  AMPAC Total Score: 24    Treatment & Education:  OT eval performed with PT this date, pt agreeable to therapy.   Pt reports be at/near her baseline at this time & notes no deficits.   Pt performing bed mobility independently & functional mobility Marybeth with no AD.   Anticipate pt to d/c to home with 24/7 supervision & no needs.   No further acute OT needs noted at this time, d/c OT.     Patient left up in chair with all lines intact, call button in reach, chair alarm on, and nsg notified    GOALS:   Multidisciplinary Problems       Occupational Therapy Goals  "         Problem: Occupational Therapy    Goal Priority Disciplines Outcome Interventions   Occupational Therapy Goal     OT, PT/OT Adequate for Care Transition                        History:     Past Medical History:   Diagnosis Date    Abnormal uterine bleeding     Adjustment disorder 03/02/2020    Patient stated that the divorce issues "still linger within" her soul. Patient stated she is grateful she had no children.     Alcohol abuse 02/09/2022    Patient stated that she does occasionally use alcohol in excess.     Anticoagulant long-term use     Anxiety 2016    Patient stated that she went to the [facility] six years ago for her anxiety disorder.     Arthritis     Asthma     Bipolar 1 disorder     Chronic pain     Depression 2016    Patient stated that her depression and anxiety have been occurring around the same time.     DVT (deep venous thrombosis)     GERD without esophagitis 3/23/2016    H/O bilateral salpingectomy     History of psychiatric hospitalization 02/01/2020    Patient stated that she thinks she was last hospitalized aound 2020.     Hx of psychiatric care 2016    Patient stated she has been non-complaint with her psychiatric medicaitons.     Hypertension     Insomnia     Kidney stone     Migraine headache     Non-occlusive coronary artery disease 6/30/2020    Psychiatric problem 2016    Patient stated that she would agree with the statement that she "Numbs" things out.     Pulmonary embolism     Schizoaffective disorder     Schizophrenia     Seizures     "few years ago"    Status post laparoscopic assisted vaginal hysterectomy (LAVH)     with BSO    Substance abuse     Suicide attempt 09/13/2020    Patient stated that she does not remember the method or plan when she last attempted suicide; says that she did not go to the hospital for the suicide attempt though; thinks it maight have involved a drug overdose on her prescribed medications.      Therapy 2016    Patient has been attending " the [facility] and is in their case management program.     Tobacco abuse 2015         Past Surgical History:   Procedure Laterality Date    CERVICAL BIOPSY  W/ LOOP ELECTRODE EXCISION       SECTION  2001    CHOLECYSTECTOMY      COLONOSCOPY N/A 2022    Procedure: COLONOSCOPY;  Surgeon: Cristiane Chavez MD;  Location: [facility] include 234 beds at the [facility];  Service: Endoscopy;  Laterality: N/A;    CYSTOSCOPY W/ URETERAL STENT PLACEMENT Left 2019    Procedure: CYSTOSCOPY, WITH URETERAL STENT INSERTION;  Surgeon: Beto Sawant MD;  Location: [facility];  Service: Urology;  Laterality: Left;    CYSTOSCOPY WITH URETEROSCOPY, RETROGRADE PYELOGRAPHY, AND INSERTION OF STENT Left 2019    Procedure: CYSTOSCOPY, WITH RETROGRADE PYELOGRAM AND URETERAL STENT INSERTION;  Surgeon: Rich García II, MD;  Location: [facility];  Service: Urology;  Laterality: Left;    ENDOMETRIAL ABLATION      ESOPHAGOGASTRODUODENOSCOPY N/A 2021    Procedure: EGD (ESOPHAGOGASTRODUODENOSCOPY);  Surgeon: Cristiane Chavez MD;  Location: [facility] include 234 beds at the [facility];  Service: Endoscopy;  Laterality: N/A;    ESOPHAGOGASTRODUODENOSCOPY N/A 2022    Procedure: EGD (ESOPHAGOGASTRODUODENOSCOPY);  Surgeon: Cristiane Chavez MD;  Location: [facility] include 234 beds at the [facility];  Service: Endoscopy;  Laterality: N/A;    HYSTERECTOMY      KIDNEY SURGERY      KNEE SURGERY Left     Replacement of left knee cap    LAPAROSCOPIC CHOLECYSTECTOMY N/A 2020    Procedure: CHOLECYSTECTOMY, LAPAROSCOPIC;  Surgeon: Luis Bogran-Reyes, MD;  Location: [facility];  Service: General;  Laterality: N/A;    LASER LITHOTRIPSY Left 2019    Procedure: LITHOTRIPSY, USING LASER;  Surgeon: Rich García II, MD;  Location: [facility];  Service: Urology;  Laterality: Left;    LEFT HEART CATHETERIZATION Left 6/15/2020    Procedure: Left heart cath;  Surgeon: Oseas Olson MD;  Location: [facility] C[facility] LAB;  Service: Cardiology;  Laterality: Left;    PHLEBOGRAPHY N/A 2021    Procedure:  Venogram;  Surgeon: Eliel Perez MD;  Location: [facility] CATH;  Service: Cardiovascular;  Laterality: N/A;    TONSILLECTOMY, ADENOIDECTOMY      TUBAL LIGATION  2001    URETEROSCOPY Left 7/8/2019    Procedure: URETEROSCOPY;  Surgeon: Rich García II, MD;  Location: [facility];  Service: Urology;  Laterality: Left;    WISDOM TOOTH EXTRACTION         Time Tracking:     OT Date of Treatment: 04/24/23  OT Start Time: 1008  OT Stop Time: 1021  OT Total Time (min): 13 min    Billable Minutes:Evaluation 13 with PT    4/24/2023

## 2023-04-24 NOTE — SUBJECTIVE & OBJECTIVE
Interval History: NAOEN, MRI, MRA negative, facial numbness resolved    Review of Systems   Constitutional:  Negative for chills, fever and unexpected weight change.   HENT:  Negative for congestion, rhinorrhea, sneezing and sore throat.    Eyes:  Negative for redness and visual disturbance.   Respiratory:  Negative for cough, chest tightness, shortness of breath and wheezing.    Cardiovascular:  Negative for chest pain and leg swelling.   Gastrointestinal:  Negative for abdominal pain, blood in stool, constipation and diarrhea.   Genitourinary:  Negative for dysuria and hematuria.   Musculoskeletal:  Negative for arthralgias and joint swelling.   Skin:  Negative for color change and pallor.   Neurological:  Negative for light-headedness, numbness and headaches.   Psychiatric/Behavioral:  Negative for agitation and confusion.    Objective:     Vital Signs (Most Recent):  Temp: 97.6 °F (36.4 °C) (04/24/23 0756)  Pulse: 108 (04/24/23 0756)  Resp: 18 (04/24/23 0756)  BP: 132/63 (04/24/23 0756)  SpO2: (!) 94 % (04/24/23 0831)   Vital Signs (24h Range):  Temp:  [97.5 °F (36.4 °C)-98.5 °F (36.9 °C)] 97.6 °F (36.4 °C)  Pulse:  [] 108  Resp:  [16-18] 18  SpO2:  [93 %-97 %] 94 %  BP: (101-139)/(63-87) 132/63     Weight: 121.1 kg (267 lb)  Body mass index is 45.83 kg/m².  No intake or output data in the 24 hours ending 04/24/23 1142   Physical Exam  Vitals and nursing note reviewed.   Constitutional:       Appearance: Normal appearance.   HENT:      Head: Normocephalic and atraumatic.      Right Ear: Tympanic membrane normal.      Left Ear: Tympanic membrane normal.      Mouth/Throat:      Mouth: Mucous membranes are moist.      Pharynx: Oropharynx is clear.   Eyes:      Extraocular Movements: Extraocular movements intact.      Pupils: Pupils are equal, round, and reactive to light.   Cardiovascular:      Rate and Rhythm: Normal rate and regular rhythm.      Pulses: Normal pulses.      Heart sounds: Normal heart sounds.    Pulmonary:      Effort: Pulmonary effort is normal.      Breath sounds: Normal breath sounds.   Abdominal:      General: Abdomen is flat.      Palpations: Abdomen is soft.   Musculoskeletal:         General: Normal range of motion.      Cervical back: Normal range of motion.   Skin:     General: Skin is warm.   Neurological:      Mental Status: She is alert and oriented to person, place, and time.      Comments: Left sided facial numbness resolved , no weakness appreciated    Psychiatric:         Mood and Affect: Mood normal.       Significant Labs: All pertinent labs within the past 24 hours have been reviewed.  CBC:   Recent Labs   Lab 04/22/23 1828 04/24/23  0315   WBC 11.40 11.22   HGB 15.6 16.2*   HCT 45.4 47.4    200       CMP:   Recent Labs   Lab 04/22/23 1828 04/24/23 0315    137   K 3.7 3.9    102   CO2 29 26   * 110   BUN 11 8   CREATININE 0.97 1.0   CALCIUM 9.1 9.3   PROT 6.7 6.5   ALBUMIN 3.9 3.3*   BILITOT 0.5 0.4   ALKPHOS 90 79   AST 28 11   ALT 31 18   ANIONGAP 7* 9         Significant Imaging: I have reviewed all pertinent imaging results/findings within the past 24 hours.

## 2023-04-24 NOTE — CONSULTS
"Consult received for "NPO r/o CVA". Noted pt now on dysphagia Mech Soft diet. Please re-consult if needed.   "

## 2023-04-24 NOTE — ASSESSMENT & PLAN NOTE
Endorsed improving, 2 day history of left-sided numbness of the face  CT at outside ED negative    Plan:   -MRI, CTA head and neck   -consult PT OT SLP   -consult Neurology appreciate recs   -already on Xarelto for chronic PE, allergy to aspirin  -continue atorvastatin 40 mg   -thiamine  - replete B12

## 2023-04-24 NOTE — PLAN OF CARE
OT eval performed with PT this date, pt agreeable to therapy. Pt reports be at/near her baseline at this time & notes no deficits. Pt performing bed mobility independently & functional mobility Marybeth with no AD. Anticipate pt to d/c to home with 24/7 supervision & no needs. No further acute OT needs noted at this time, d/c OT.     Problem: Occupational Therapy  Goal: Occupational Therapy Goal  Outcome: Adequate for Care Transition

## 2023-04-24 NOTE — PT/OT/SLP EVAL
"Speech Language Pathology Evaluation  Cognitive/Bedside Swallow  Discharge    Patient Name:  Rose Sparks   MRN:  [id number]  K470/K470 A    Admitting Diagnosis: Numbness and tingling of left side of face    Recommendations:                  General Recommendations:  Follow-up not indicated  Diet recommendations:  Regular, Thin   Aspiration Precautions: Standard aspiration precautions   General Precautions: Standard,    Communication strategies:  none    History:     Past Medical History:   Diagnosis Date    Abnormal uterine bleeding     Adjustment disorder 03/02/2020    Patient stated that the divorce issues "still linger within" her soul. Patient stated she is grateful she had no children.     Alcohol abuse 02/09/2022    Patient stated that she does occasionally use alcohol in excess.     Anticoagulant long-term use     Anxiety 2016    Patient stated that she went to the [facility] six years ago for her anxiety disorder.     Arthritis     Asthma     Bipolar 1 disorder     Chronic pain     Depression 2016    Patient stated that her depression and anxiety have been occurring around the same time.     DVT (deep venous thrombosis)     GERD without esophagitis 3/23/2016    H/O bilateral salpingectomy     History of psychiatric hospitalization 02/01/2020    Patient stated that she thinks she was last hospitalized aound 2020.     Hx of psychiatric care 2016    Patient stated she has been non-complaint with her psychiatric medicaitons.     Hypertension     Insomnia     Kidney stone     Migraine headache     Non-occlusive coronary artery disease 6/30/2020    Psychiatric problem 2016    Patient stated that she would agree with the statement that she "Numbs" things out.     Pulmonary embolism     Schizoaffective disorder     Schizophrenia     Seizures     "few years ago"    Status post laparoscopic assisted vaginal hysterectomy (LAVH)     with BSO    Substance abuse     Suicide attempt 09/13/2020    Patient stated " that she does not remember the method or plan when she last attempted suicide; says that she did not go to the hospital for the suicide attempt though; thinks it maight have involved a drug overdose on her prescribed medications.      Therapy     Patient has been attending the [facility] and is in their case management program.     Tobacco abuse 2015       Past Surgical History:   Procedure Laterality Date    CERVICAL BIOPSY  W/ LOOP ELECTRODE EXCISION       SECTION  ,     CHOLECYSTECTOMY      COLONOSCOPY N/A 2022    Procedure: COLONOSCOPY;  Surgeon: Cristiane Chavez MD;  Location: [facility];  Service: Endoscopy;  Laterality: N/A;    CYSTOSCOPY W/ URETERAL STENT PLACEMENT Left 2019    Procedure: CYSTOSCOPY, WITH URETERAL STENT INSERTION;  Surgeon: Beto Sawant MD;  Location: [facility];  Service: Urology;  Laterality: Left;    CYSTOSCOPY WITH URETEROSCOPY, RETROGRADE PYELOGRAPHY, AND INSERTION OF STENT Left 2019    Procedure: CYSTOSCOPY, WITH RETROGRADE PYELOGRAM AND URETERAL STENT INSERTION;  Surgeon: Rich García II, MD;  [facility];  Service: Urology;  Laterality: Left;    ENDOMETRIAL ABLATION      ESOPHAGOGASTRODUODENOSCOPY N/A 2021    Procedure: EGD (ESOPHAGOGASTRODUODENOSCOPY);  Surgeon: Cristiane Chavez MD;  Location: [facility];  Service: Endoscopy;  Laterality: N/A;    ESOPHAGOGASTRODUODENOSCOPY N/A 2022    Procedure: EGD (ESOPHAGOGASTRODUODENOSCOPY);  Surgeon: Cristiane Chavez MD;  Location: [facility];  Service: Endoscopy;  Laterality: N/A;    HYSTERECTOMY      KIDNEY SURGERY      KNEE SURGERY Left     Replacement of left knee cap    LAPAROSCOPIC CHOLECYSTECTOMY N/A 2020    Procedure: CHOLECYSTECTOMY, LAPAROSCOPIC;  Surgeon: Luis Bogran-Reyes, MD;  [facility]: [facility];  Service: General;  Laterality: N/A;    LASER LITHOTRIPSY Left 2019    Procedure: LITHOTRIPSY, USING LASER;  Surgeon: Rich García II, MD;   [facility];  Service: Urology;  Laterality: Left;    LEFT HEART CATHETERIZATION Left 6/15/2020    Procedure: Left heart cath;  Surgeon: Oseas Olson MD;  Location: [facility] CATH LAB;  Service: Cardiology;  Laterality: Left;    PHLEBOGRAPHY N/A 5/24/2021    Procedure: Venogram;  Surgeon: Eliel Perez MD;  Location: [facility] CATH;  Service: Cardiovascular;  Laterality: N/A;    TONSILLECTOMY, ADENOIDECTOMY      TUBAL LIGATION  2001    URETEROSCOPY Left 7/8/2019    Procedure: URETEROSCOPY;  Surgeon: Rich García II, MD;  Location: [facility] OR;  Service: Urology;  Laterality: Left;    WISDOM TOOTH EXTRACTION       MD note: HPI: 47 yo female with pmh of schizophrenia, DVT, coronary artery disease pulmonary embolus who presented to [facility] emergency department with left-sided numbness and dizziness. Symptoms started 4/21 at 7 pm and persisted into 4/22 am. Pt also initially endorsed headache.  Also endorsed left-sided chest tightness and shortness of breath.  Patient has been adherent to all her medications only recent medication change was starting Seroquel and discontinuing trazodone 2 weeks ago for her insomnia which has improved.  Denies any weakness, vision changes, change in speech and has had similar episodes of dizziness before.  In outside ED patient's headache improved, CT head did not reveal any acute intracranial findings.  Patient was transferred to [facility] for neurologic evaluation and MRI which were unavailable at [facility].  [facility] was consulted for admission for patient's left-sided numbness potentially secondary to stroke neurological workup.    Social History: Patient lives with sister.    Prior diet: reg/thin; patient does not have dentures    Occupation/hobbies/homemaking: Patient does not work. She has a 27 year old daughter and a grandchild. She has 2 dogs.     Subjective     Patient awake and cooperative.    Patient goals: to go home    Pain/Comfort:  Pain Rating 1:  0/10      Objective:     COGNITIVE STATUS:  Behavioral Observations: alert and cooperative  Memory:  Immediate: WFL   Short Term: WFL   Long Term: WFL   Orientation: Oriented x4   Attention: WFL.  Problem Solving: WFL   Insight Awareness: pt with good insight   Sequencing: WFL  Pragmatics: WNL  Money/Time Management: WFL    LANGUAGE:  Receptive Language:   Complex y/n Questions: % accuracy  Identification: % accuracy  2 Step Directions: % accuracy  Complex Directions: % accuracy    Expressive Language:  Naming: WFL  Automatic Speech: WFL  Sentence Completion: WFL   Responsive Speech: WFL   Repetition: WFL     Motor Speech: No noted Dysarthria, Apraxia of Speech, Ataxia    Voice: adequate volumate, rate, prosody, breath support    Augmentative Alternative Communication: no    Oral Musculature Evaluation  Oral Musculature: WFL  Dentition: edentulous, rarely or never uses dentures to eat  Mucosal Quality: adequate  Mandibular Strength and Mobility: WFL  Oral Labial Strength and Mobility: WFL  Lingual Strength and Mobility: WFL (tongue piercing)  Volitional Cough: elicited  Volitional Swallow: timely  Voice Prior to PO Intake: clear    Bedside Swallow Eval:   Consistencies Assessed:  Thin liquid sips of water via cup and straw   Solids bites of domonique cracker      Oral Phase:   WFL     Pharyngeal Phase:   no overt clinical signs/symptoms of aspiration  no overt clinical signs/symptoms of pharyngeal dysphagia     Compensatory Strategies  None     Treatment: SLP provided patient education on SLP role, s/s and risks of aspiraiton, safe swallow precautions, and POC. Patient v/u of all discussed.     Assessment:     Rose Sparks is a 48 y.o. female at baseline for swallowing, speech, language, or cognitive impairments. No further skilled acute Speech Therapy services warranted at this time. Please re-consult as needed.     Goals:   Multidisciplinary Problems       SLP Goals       Not on file               Multidisciplinary Problems (Resolved)          Problem: SLP    Goal Priority Disciplines Outcome   SLP Goal   (Resolved)     SLP Met                       Plan:     Plan of Care reviewed with:  patient   SLP Follow-Up:  No       Discharge recommendations:  Discharge Facility/Level of Care Needs: home   Barriers to Discharge:  None    Time Tracking:     SLP Treatment Date:   04/24/23  Speech Start Time:  1023  Speech Stop Time:  1039     Speech Total Time (min):  16 min    Billable Minutes: Eval 8  and Eval Swallow and Oral Function 8    04/24/2023

## 2023-04-24 NOTE — PT/OT/SLP EVAL
Physical Therapy Evaluation and Discharge Note    Patient Name:  Rose Sparks   MRN:  [id number]    Recommendations:     Discharge Recommendations: home  Discharge Equipment Recommendations: none   Barriers to discharge: None    Assessment:     Rose Sparks is a 48 y.o. female admitted with a medical diagnosis of Numbness and tingling of left side of face. .  At this time, patient is functioning at their prior level of function and does not require further acute PT services.     Pt ambulated ~200 ft in schreiber with no AD and with Mod I. No LOB. Pt reports symptoms resolved. Pt is functioning near/at her baseline level of function. Recommending d/c home with no DME or therapy needs. D/c PT.     Recent Surgery: * No surgery found *      Plan:     During this hospitalization, patient does not require further acute PT services.  Please re-consult if situation changes.      Subjective     Chief Complaint: none  Patient/Family Comments/goals: return home, symptoms resolved (facial numbness/tingling)  Pain/Comfort:  Pain Rating 1: 0/10    Patients cultural, spiritual, Christian conflicts given the current situation: no    Living Environment:  Pt lives with sister in a trailer with ramp access and B HR; T/S with shower chair  Prior to admission, patients level of function was Independent with no AD.  Equipment used at home: shower chair.  Upon discharge, patient will have assistance from sister who is home with pt.    Objective:     Communicated with nsg prior to session.  Patient found HOB elevated with telemetry upon PT entry to room.    General Precautions: Standard, fall    Orthopedic Precautions:N/A   Braces: N/A  Respiratory Status: Room air    Exams:  Cognitive Exam:  Patient is oriented to Person, Place, Time, and Situation  Fine Motor Coordination:    -       Intact  RLE heel shin, LLE heel shin, and Rapid alternating ankle DF/PF  Postural Exam:  Patient presented with the following abnormalities:    -       " Rounded shoulders  Sensation:    -       Intact  light/touch BLE and face  Skin Integrity/Edema:      -       Skin integrity: Visible skin intact  -       Edema: None noted BLE  No facial asymmetries noted   RLE ROM: WFL  RLE Strength: WFL  LLE ROM: WFL  LLE Strength: WFL  WFL SLS BLE    Functional Mobility:  Bed Mobility:     Scooting: modified independence  Supine to Sit: modified independence  Transfers:     Sit to Stand:  modified independence with no AD  Gait: Pt ambulated ~200 ft in schreiber with no AD and with Mod I. No LOB.     AM-PAC 6 CLICK MOBILITY  Total Score:23       Treatment and Education:  Pt educated on role of PT/POC, s/s of stroke and seeking medical attention immediately, and overall home safety  Pt ambulate in schreiber as reported above  Pt up in chair at end of session    AM-PAC 6 CLICK MOBILITY  Total Score:23     Patient left up in chair with all lines intact, call button in reach, chair alarm on, and nsg notified.    GOALS:   Multidisciplinary Problems       Physical Therapy Goals       Not on file              Multidisciplinary Problems (Resolved)          Problem: Physical Therapy    Goal Priority Disciplines Outcome Goal Variances Interventions   Physical Therapy Goal   (Resolved)     PT, PT/OT Met                         History:     Past Medical History:   Diagnosis Date    Abnormal uterine bleeding     Adjustment disorder 03/02/2020    Patient stated that the divorce issues "still linger within" her soul. Patient stated she is grateful she had no children.     Alcohol abuse 02/09/2022    Patient stated that she does occasionally use alcohol in excess.     Anticoagulant long-term use     Anxiety 2016    Patient stated that she went to the [facility] six years ago for her anxiety disorder.     Arthritis     Asthma     Bipolar 1 disorder     Chronic pain     Depression 2016    Patient stated that her depression and anxiety have been occurring around the same time.     DVT (deep venous " "thrombosis)     GERD without esophagitis 3/23/2016    H/O bilateral salpingectomy     History of psychiatric hospitalization 2020    Patient stated that she thinks she was last hospitalized ao2020.     Hx of psychiatric care     Patient stated she has been non-complaint with her psychiatric medicaitons.     Hypertension     Insomnia     Kidney stone     Migraine headache     Non-occlusive coronary artery disease 2020    Psychiatric problem 2016    Patient stated that she would agree with the statement that she "Numbs" things out.     Pulmonary embolism     Schizoaffective disorder     Schizophrenia     Seizures     "few years ago"    Status post laparoscopic assisted vaginal hysterectomy (LAVH)     with BSO    Substance abuse     Suicide attempt 2020    Patient stated that she does not remember the method or plan when she last attempted suicide; says that she did not go to the hospital for the suicide attempt though; thinks it maight have involved a drug overdose on her prescribed medications.      Therapy 2016    Patient has been attending the [facility] and is in their case management program.     Tobacco abuse 2015       Past Surgical History:   Procedure Laterality Date    CERVICAL BIOPSY  W/ LOOP ELECTRODE EXCISION       SECTION  ,     CHOLECYSTECTOMY      COLONOSCOPY N/A 2022    Procedure: COLONOSCOPY;  Surgeon: Cristiane Chavez MD;  [facility]: [facility];  Service: Endoscopy;  Laterality: N/A;    CYSTOSCOPY W/ URETERAL STENT PLACEMENT Left 2019    Procedure: CYSTOSCOPY, WITH URETERAL STENT INSERTION;  Surgeon: Beto Sawant MD;  Location: [facility];  Service: Urology;  Laterality: Left;    CYSTOSCOPY WITH URETEROSCOPY, RETROGRADE PYELOGRAPHY, AND INSERTION OF STENT Left 2019    Procedure: CYSTOSCOPY, WITH RETROGRADE PYELOGRAM AND URETERAL STENT INSERTION;  Surgeon: Rich García II, MD;  [facility];  Service: Urology;  " Laterality: Left;    ENDOMETRIAL ABLATION      ESOPHAGOGASTRODUODENOSCOPY N/A 5/19/2021    Procedure: EGD (ESOPHAGOGASTRODUODENOSCOPY);  Surgeon: Cristiane Chavez MD;  Location: [facility];  Service: Endoscopy;  Laterality: N/A;    ESOPHAGOGASTRODUODENOSCOPY N/A 5/30/2022    Procedure: EGD (ESOPHAGOGASTRODUODENOSCOPY);  Surgeon: Cristiane Chavez MD;  Location: [facility];  Service: Endoscopy;  Laterality: N/A;    HYSTERECTOMY      KIDNEY SURGERY      KNEE SURGERY Left     Replacement of left knee cap    LAPAROSCOPIC CHOLECYSTECTOMY N/A 6/23/2020    Procedure: CHOLECYSTECTOMY, LAPAROSCOPIC;  Surgeon: Luis Bogran-Reyes, MD;  Location: [facility];  Service: General;  Laterality: N/A;    LASER LITHOTRIPSY Left 7/8/2019    Procedure: LITHOTRIPSY, USING LASER;  Surgeon: Rich García II, MD;  Location: [facility];  Service: Urology;  Laterality: Left;    LEFT HEART CATHETERIZATION Left 6/15/2020    Procedure: Left heart cath;  Surgeon: Oseas Olson MD;  Location: [facility] CATH LAB;  Service: Cardiology;  Laterality: Left;    PHLEBOGRAPHY N/A 5/24/2021    Procedure: Venogram;  Surgeon: Eliel Perez MD;  Location: [facility] CATH;  Service: Cardiovascular;  Laterality: N/A;    TONSILLECTOMY, ADENOIDECTOMY      TUBAL LIGATION  2001    URETEROSCOPY Left 7/8/2019    Procedure: URETEROSCOPY;  Surgeon: Rich García II, MD;  Location: [facility];  Service: Urology;  Laterality: Left;    WISDOM TOOTH EXTRACTION         Time Tracking:     PT Received On: 04/24/23  PT Start Time: 1008     PT Stop Time: 1021  PT Total Time (min): 13 min     Billable Minutes: Evaluation 13 (co-eval with OT)      04/24/2023

## 2023-04-24 NOTE — SUBJECTIVE & OBJECTIVE
Interval History: NAOEN, still awaiting MRI, echo.    Review of Systems   Constitutional:  Negative for chills, fever and unexpected weight change.   HENT:  Negative for congestion, rhinorrhea, sneezing and sore throat.    Eyes:  Negative for redness and visual disturbance.   Respiratory:  Positive for chest tightness. Negative for cough, shortness of breath and wheezing.    Cardiovascular:  Negative for chest pain and leg swelling.   Gastrointestinal:  Negative for abdominal pain, blood in stool, constipation and diarrhea.   Genitourinary:  Negative for dysuria and hematuria.   Musculoskeletal:  Negative for arthralgias and joint swelling.   Skin:  Negative for color change and pallor.   Neurological:  Positive for numbness and headaches. Negative for light-headedness.   Psychiatric/Behavioral:  Negative for agitation and confusion.    Objective:     Vital Signs (Most Recent):  Temp: 97.6 °F (36.4 °C) (04/24/23 0756)  Pulse: 108 (04/24/23 0756)  Resp: 18 (04/24/23 0756)  BP: 132/63 (04/24/23 0756)  SpO2: (!) 94 % (04/24/23 0831)   Vital Signs (24h Range):  Temp:  [97.5 °F (36.4 °C)-98.5 °F (36.9 °C)] 97.6 °F (36.4 °C)  Pulse:  [] 108  Resp:  [16-20] 18  SpO2:  [93 %-97 %] 94 %  BP: (101-143)/(63-87) 132/63     Weight: 121.1 kg (267 lb)  Body mass index is 45.83 kg/m².  No intake or output data in the 24 hours ending 04/24/23 1117   Physical Exam  Vitals and nursing note reviewed.   Constitutional:       Appearance: Normal appearance.   HENT:      Head: Normocephalic and atraumatic.      Right Ear: Tympanic membrane normal.      Left Ear: Tympanic membrane normal.      Mouth/Throat:      Mouth: Mucous membranes are moist.      Pharynx: Oropharynx is clear.   Eyes:      Extraocular Movements: Extraocular movements intact.      Pupils: Pupils are equal, round, and reactive to light.   Cardiovascular:      Rate and Rhythm: Normal rate and regular rhythm.      Pulses: Normal pulses.      Heart sounds: Normal heart  sounds.   Pulmonary:      Effort: Pulmonary effort is normal.      Breath sounds: Normal breath sounds.   Abdominal:      General: Abdomen is flat.      Palpations: Abdomen is soft.   Musculoskeletal:         General: Normal range of motion.      Cervical back: Normal range of motion.   Skin:     General: Skin is warm.   Neurological:      Mental Status: She is alert and oriented to person, place, and time.      Comments: Left sided facial numbness, no weakness appreciated    Psychiatric:         Mood and Affect: Mood normal.       Significant Labs: All pertinent labs within the past 24 hours have been reviewed.  CBC:   Recent Labs   Lab 04/22/23 1828 04/24/23 0315   WBC 11.40 11.22   HGB 15.6 16.2*   HCT 45.4 47.4    200     CMP:   Recent Labs   Lab 04/22/23 1828 04/24/23 0315    137   K 3.7 3.9    102   CO2 29 26   * 110   BUN 11 8   CREATININE 0.97 1.0   CALCIUM 9.1 9.3   PROT 6.7 6.5   ALBUMIN 3.9 3.3*   BILITOT 0.5 0.4   ALKPHOS 90 79   AST 28 11   ALT 31 18   ANIONGAP 7* 9       Significant Imaging: I have reviewed all pertinent imaging results/findings within the past 24 hours.

## 2023-04-25 ENCOUNTER — CLINICAL SUPPORT (OUTPATIENT)
Dept: SMOKING CESSATION | Facility: CLINIC | Age: 48
End: 2023-04-25
Payer: COMMERCIAL

## 2023-04-25 VITALS
BODY MASS INDEX: 45.58 KG/M2 | TEMPERATURE: 97 F | RESPIRATION RATE: 18 BRPM | DIASTOLIC BLOOD PRESSURE: 71 MMHG | OXYGEN SATURATION: 98 % | HEART RATE: 122 BPM | HEIGHT: 64 IN | SYSTOLIC BLOOD PRESSURE: 118 MMHG | WEIGHT: 267 LBS

## 2023-04-25 DIAGNOSIS — F17.210 CIGARETTE SMOKER: Primary | ICD-10-CM

## 2023-04-25 LAB
ALBUMIN SERPL BCP-MCNC: 3.4 G/DL (ref 3.5–5.2)
ALP SERPL-CCNC: 96 U/L (ref 55–135)
ALT SERPL W/O P-5'-P-CCNC: 17 U/L (ref 10–44)
ANION GAP SERPL CALC-SCNC: 13 MMOL/L (ref 8–16)
AST SERPL-CCNC: 12 U/L (ref 10–40)
BASOPHILS # BLD AUTO: 0.07 K/UL (ref 0–0.2)
BASOPHILS NFR BLD: 0.7 % (ref 0–1.9)
BILIRUB SERPL-MCNC: 0.2 MG/DL (ref 0.1–1)
BUN SERPL-MCNC: 11 MG/DL (ref 6–20)
CALCIUM SERPL-MCNC: 9.8 MG/DL (ref 8.7–10.5)
CHLORIDE SERPL-SCNC: 99 MMOL/L (ref 95–110)
CO2 SERPL-SCNC: 27 MMOL/L (ref 23–29)
CREAT SERPL-MCNC: 1.1 MG/DL (ref 0.5–1.4)
DIFFERENTIAL METHOD: ABNORMAL
EOSINOPHIL # BLD AUTO: 0.2 K/UL (ref 0–0.5)
EOSINOPHIL NFR BLD: 1.8 % (ref 0–8)
ERYTHROCYTE [DISTWIDTH] IN BLOOD BY AUTOMATED COUNT: 13.2 % (ref 11.5–14.5)
EST. GFR  (NO RACE VARIABLE): >60 ML/MIN/1.73 M^2
GLUCOSE SERPL-MCNC: 143 MG/DL (ref 70–110)
HCT VFR BLD AUTO: 46.3 % (ref 37–48.5)
HGB BLD-MCNC: 15.8 G/DL (ref 12–16)
IMM GRANULOCYTES # BLD AUTO: 0.04 K/UL (ref 0–0.04)
IMM GRANULOCYTES NFR BLD AUTO: 0.4 % (ref 0–0.5)
LYMPHOCYTES # BLD AUTO: 2.8 K/UL (ref 1–4.8)
LYMPHOCYTES NFR BLD: 26.5 % (ref 18–48)
MCH RBC QN AUTO: 33.2 PG (ref 27–31)
MCHC RBC AUTO-ENTMCNC: 34.1 G/DL (ref 32–36)
MCV RBC AUTO: 97 FL (ref 82–98)
MONOCYTES # BLD AUTO: 0.7 K/UL (ref 0.3–1)
MONOCYTES NFR BLD: 6.9 % (ref 4–15)
NEUTROPHILS # BLD AUTO: 6.8 K/UL (ref 1.8–7.7)
NEUTROPHILS NFR BLD: 63.7 % (ref 38–73)
NRBC BLD-RTO: 0 /100 WBC
PLATELET # BLD AUTO: 208 K/UL (ref 150–450)
PMV BLD AUTO: 8.9 FL (ref 9.2–12.9)
POTASSIUM SERPL-SCNC: 3.7 MMOL/L (ref 3.5–5.1)
PROT SERPL-MCNC: 6.4 G/DL (ref 6–8.4)
RBC # BLD AUTO: 4.76 M/UL (ref 4–5.4)
SODIUM SERPL-SCNC: 139 MMOL/L (ref 136–145)
WBC # BLD AUTO: 10.63 K/UL (ref 3.9–12.7)

## 2023-04-25 PROCEDURE — 36415 COLL VENOUS BLD VENIPUNCTURE: CPT | Performed by: STUDENT IN AN ORGANIZED HEALTH CARE EDUCATION/TRAINING PROGRAM

## 2023-04-25 PROCEDURE — S4991 NICOTINE PATCH NONLEGEND: HCPCS | Performed by: FAMILY MEDICINE

## 2023-04-25 PROCEDURE — 99406 PT REFUSED TOBACCO CESSATION: ICD-10-PCS | Mod: S$GLB,,,

## 2023-04-25 PROCEDURE — 25000003 PHARM REV CODE 250: Performed by: STUDENT IN AN ORGANIZED HEALTH CARE EDUCATION/TRAINING PROGRAM

## 2023-04-25 PROCEDURE — 99900035 HC TECH TIME PER 15 MIN (STAT)

## 2023-04-25 PROCEDURE — 25000003 PHARM REV CODE 250: Performed by: FAMILY MEDICINE

## 2023-04-25 PROCEDURE — G0378 HOSPITAL OBSERVATION PER HR: HCPCS

## 2023-04-25 PROCEDURE — 85025 COMPLETE CBC W/AUTO DIFF WBC: CPT | Performed by: STUDENT IN AN ORGANIZED HEALTH CARE EDUCATION/TRAINING PROGRAM

## 2023-04-25 PROCEDURE — 80053 COMPREHEN METABOLIC PANEL: CPT | Performed by: STUDENT IN AN ORGANIZED HEALTH CARE EDUCATION/TRAINING PROGRAM

## 2023-04-25 PROCEDURE — 99406 BEHAV CHNG SMOKING 3-10 MIN: CPT | Mod: S$GLB,,,

## 2023-04-25 RX ORDER — POTASSIUM CHLORIDE 20 MEQ/1
40 TABLET, EXTENDED RELEASE ORAL EVERY 4 HOURS
Status: COMPLETED | OUTPATIENT
Start: 2023-04-25 | End: 2023-04-25

## 2023-04-25 RX ORDER — ACETAMINOPHEN 650 MG/20.3ML
650 LIQUID ORAL EVERY 6 HOURS PRN
Status: DISCONTINUED | OUTPATIENT
Start: 2023-04-25 | End: 2023-04-25 | Stop reason: HOSPADM

## 2023-04-25 RX ADMIN — POTASSIUM CHLORIDE 40 MEQ: 1500 TABLET, EXTENDED RELEASE ORAL at 11:04

## 2023-04-25 RX ADMIN — VALSARTAN 80 MG: 40 TABLET, FILM COATED ORAL at 09:04

## 2023-04-25 RX ADMIN — OXCARBAZEPINE 600 MG: 150 TABLET, FILM COATED ORAL at 09:04

## 2023-04-25 RX ADMIN — POTASSIUM CHLORIDE 40 MEQ: 1500 TABLET, EXTENDED RELEASE ORAL at 09:04

## 2023-04-25 RX ADMIN — CYANOCOBALAMIN TAB 1000 MCG 1000 MCG: 1000 TAB at 09:04

## 2023-04-25 RX ADMIN — BUSPIRONE HYDROCHLORIDE 10 MG: 5 TABLET ORAL at 09:04

## 2023-04-25 RX ADMIN — ACETAMINOPHEN 650 MG: 650 SOLUTION ORAL at 09:04

## 2023-04-25 RX ADMIN — HYDROCHLOROTHIAZIDE 12.5 MG: 12.5 TABLET ORAL at 09:04

## 2023-04-25 RX ADMIN — ATORVASTATIN CALCIUM 40 MG: 40 TABLET, FILM COATED ORAL at 09:04

## 2023-04-25 RX ADMIN — NICOTINE 1 PATCH: 14 PATCH, EXTENDED RELEASE TRANSDERMAL at 09:04

## 2023-04-25 RX ADMIN — RIVAROXABAN 20 MG: 20 TABLET, FILM COATED ORAL at 09:04

## 2023-04-25 NOTE — NURSING
" PROACTIVE NURSE ROUNDING NOTE     Time of Visit:     Admit Date: 2023  LOS: 0  Code Status: Full Code   Date of Visit: 2023  : 1975  Age: 48 y.o.  Sex: female  Race: White  Bed: K470/K470 A:   MRN: [id number]  Was the patient discharged from an ICU this admission? no   Was the patient discharged from a PACU within last 24 hours?  no  Did the patient receive conscious sedation/general anesthesia in last 24 hours?  no  Was the patient in the ED within the past 24 hours?  no  Was the patient started on NIPPV within the past 24 hours?  no  Attending Physician: Frank Hollingsworth DO  Primary Service: Networked reference to record PCT     ASSESSMENT     Notified by  charge nurse .  Reason for alert: SBP  in the 80s and drowsiness.    Diagnosis: Numbness and tingling of left side of face    Abnormal Vital Signs: BP (!) 82/62   Pulse 108   Temp 97.3 °F (36.3 °C) (Oral)   Resp 18   Ht 5' 4" (1.626 m)   Wt 121.1 kg (267 lb)   LMP 2015 Comment: n/a  SpO2 (!) 94%   Breastfeeding No   BMI 45.83 kg/m²      Clinical Issues: Neuro    Patient  has a past medical history of Abnormal uterine bleeding, Adjustment disorder, Alcohol abuse, Anticoagulant long-term use, Anxiety, Arthritis, Asthma, Bipolar 1 disorder, Chronic pain, Depression, DVT (deep venous thrombosis), GERD without esophagitis, H/O bilateral salpingectomy, History of psychiatric hospitalization, psychiatric care, Hypertension, Insomnia, Kidney stone, Migraine headache, Non-occlusive coronary artery disease, Psychiatric problem, Pulmonary embolism, Schizoaffective disorder, Schizophrenia, Seizures, Status post laparoscopic assisted vaginal hysterectomy (LAVH), Substance abuse, Suicide attempt, Therapy, and Tobacco abuse.      Patient sleeping in bed.  Neurological status and sensation intact.  Patient seems a little drowsy.  Able to answer questions and follow commands.  Patient did receive 2 mg of ativan for a MRI earlier today.  " Results still pending.      INTERVENTIONS/ RECOMMENDATIONS     Discussed holding nightly medications: buspirone, clonidine, hydroxyzine, oxcarbazepine, qietiapine, and valsartan.    Discussed plan of care with RN, Aurora.    PHYSICIAN ESCALATION     RN to discuss with NP on call.     FOLLOW-UP     Call back the Rapid Response Nurse, Lakeshia Tanner RN at [phone] for additional questions or concerns.

## 2023-04-25 NOTE — PROGRESS NOTES
"Individual Follow-Up Form    4/25/2023    Quit Date: To be determined    Clinical Status of Patient: Inpatient    Length of Service: 30 minutes    Continuing Medication: yes  Patches    Comments: Smoking cessation education follow-up: Pt states that cravings for a cigarette are "much better" with patch in use. Order requested upon discharge for 21 mg nicotine patch Q day. Pt again declines referral to Ambulatory Smoking Cessation clinic. Handout provided.    Diagnosis: F17.210        "

## 2023-04-25 NOTE — HOSPITAL COURSE
Patient is a 48-year-old female with the above past medical history who is transferred from outside hospital to [facility] for left-sided facial numbness and CVA workup.  CT head at outside facility was negative.  CTA neck showed atherosclerotic plaquing of the carotid bifurcations and proximal ICAs with less than 50% proximal ICA stenosis by NASCET criteria.  CTA head, MRA head and neck were negative.  MRI negative.  Facial numbness resolved on day 2 of admission.  Neurology was consulted and recommended continuing Xarelto, atorvostatin and outpatient follow-up.  All questions were answered and patient was discharged home.

## 2023-04-25 NOTE — ASSESSMENT & PLAN NOTE
Endorsed improving, 2 day history of left-sided numbness of the face  CT at outside ED negative  Numbness now resolved    Plan:   -MRI, MRA head and neck negative   -consult PT OT SLP   -consult Neurology appreciate recs   -already on Xarelto for chronic PE, allergy to aspirin  -continue atorvastatin 40 mg   -thiamine  - repleted B12

## 2023-04-25 NOTE — DISCHARGE SUMMARY
[facility] Medicine  Discharge Summary      Patient Name: Rose Sparks  MRN: [id number]  JACOB: [id number]  Patient Class: OP- Observation  Admission Date: 4/23/2023  Hospital Length of Stay: 0 days  Discharge Date and Time:  04/25/2023 10:23 AM  Attending Physician: Frank Hollingsworth DO   Discharging Provider: Adams Hernandez MD  Primary Care Provider: Start Robe    Primary Care Team: Networked reference to record PCT     HPI:   47 yo female with pmh of schizophrenia, DVT, coronary artery disease pulmonary embolus who presented to [facility] emergency department with left-sided numbness and dizziness. Symptoms started 4/21 at 7 pm and persisted into 4/22 am. Pt also initially endorsed headache.  Also endorsed left-sided chest tightness and shortness of breath.  Patient has been adherent to all her medications only recent medication change was starting Seroquel and discontinuing trazodone 2 weeks ago for her insomnia which has improved.  Denies any weakness, vision changes, change in speech and has had similar episodes of dizziness before.    In outside ED patient's headache improved, CT head did not reveal any acute intracranial findings.  Patient was transferred to [facility] for neurologic evaluation and MRI which were unavailable at [facility].  [facility] Medicine was consulted for admission for patient's left-sided numbness potentially secondary to stroke neurological workup.              * No surgery found *      Hospital Course:   Patient is a 48-year-old female with the above past medical history who is transferred from outside hospital to [facility] for left-sided facial numbness and CVA workup.  CT head at outside facility was negative.  CTA neck showed atherosclerotic plaquing of the carotid bifurcations and proximal ICAs with less than 50% proximal ICA stenosis by NASCET criteria.  CTA head, MRA head and neck were negative.  MRI negative.  Facial numbness resolved on day 2 of  admission.  Neurology was consulted and recommended continuing Xarelto, atorvostatin and outpatient follow-up.  All questions were answered and patient was discharged home.       Goals of Care Treatment Preferences:  Code Status: Full Code      Consults:   Consults (From admission, onward)        Status Ordering Provider     Inpatient consult to Registered Dietitian/Nutritionist  Once        Provider:  (Not yet assigned)    Completed MEEK SANCHEZ     Inpatient consult to [facility]  Once        Provider:  (Not yet assigned)    Completed SARAH FERRELL consult to case management/social work  Once        Provider:  (Not yet assigned)    Acknowledged SARAH FERRELL          No new Assessment & Plan notes have been filed under this hospital service since the last note was generated.  Service: Hospital Medicine    Final Active Diagnoses:    Diagnosis Date Noted POA    PRINCIPAL PROBLEM:  Numbness and tingling of left side of face [R20.0, R20.2] 04/06/2022 Yes    Seizures [R56.9] 04/24/2023 Yes    Schizophrenia [F20.9] 10/03/2017 Yes    Asthma [J45.909] 05/26/2016 Yes    Anxiety and depression [F41.9, F32.A] 08/05/2015 Yes     Chronic    Recurrent pulmonary embolism [I26.99] 02/04/2015 Yes     Chronic    Primary insomnia [F51.01] 02/04/2015 Yes      Problems Resolved During this Admission:       Discharged Condition: stable    Disposition: Home or Self Care    Follow Up:    Patient Instructions:      Ambulatory referral/consult to Neurology   Standing Status: Future   Referral Priority: Routine Referral Type: Consultation   Referral Reason: Specialty Services Required   Requested Specialty: Neurology   Number of Visits Requested: 1       Significant Diagnostic Studies: Labs:   CMP   Recent Labs   Lab 04/24/23  0315 04/25/23  0311    139   K 3.9 3.7    99   CO2 26 27    143*   BUN 8 11   CREATININE 1.0 1.1   CALCIUM 9.3 9.8   PROT 6.5 6.4   ALBUMIN 3.3* 3.4*   BILITOT 0.4 0.2   ALKPHOS 79  96   AST 11 12   ALT 18 17   ANIONGAP 9 13    and CBC   Recent Labs   Lab 04/24/23  0315 04/25/23  0311   WBC 11.22 10.63   HGB 16.2* 15.8   HCT 47.4 46.3    208       Pending Diagnostic Studies:     Procedure Component Value Units Date/Time    Vitamin B1 [[id number]] Collected: 04/23/23 1428    Order Status: Sent Lab Status: In process Updated: 04/23/23 1806    Specimen: Blood     Narrative:      Collection has been rescheduled by BALBIR at 04/23/2023 13:16 Reason:   Patient unavailable and nurse is mo where to be found          Medications:  Reconciled Home Medications:      Medication List      CONTINUE taking these medications    albuterol 1.25 mg/3 mL Nebu  Commonly known as: ACCUNEB  Take 1.25 mg by nebulization every 6 (six) hours as needed. Rescue     atorvastatin 40 MG tablet  Commonly known as: LIPITOR  Take 40 mg by mouth once daily.     busPIRone 10 MG tablet  Commonly known as: BUSPAR  Take 10 mg by mouth 3 (three) times daily.     cetirizine 10 MG tablet  Commonly known as: ZYRTEC  Take 10 mg by mouth Daily.     clonazePAM 0.5 MG tablet  Commonly known as: KlonoPIN  Take 0.5 mg by mouth 2 (two) times daily as needed for Anxiety.     cloNIDine 0.2 MG tablet  Commonly known as: CATAPRES  Take 0.2 mg by mouth every evening.     cyclobenzaprine 10 MG tablet  Commonly known as: FLEXERIL  Take 1 tablet (10 mg total) by mouth 3 (three) times daily as needed for Muscle spasms.     fluticasone propionate 50 mcg/actuation nasal spray  Commonly known as: FLONASE  1 spray (50 mcg total) by Each Nostril route 2 (two) times daily as needed for Rhinitis.     hydrocortisone 2.5 % cream  Apply topically 2 (two) times daily.     hydrOXYzine pamoate 25 MG Cap  Commonly known as: VISTARIL  Take 1 capsule (25 mg total) by mouth every 6 (six) hours as needed (itching).     INVEGA SUSTENNA 234 mg/1.5 mL Syrg injection  Generic drug: paliperidone palmitate  Inject 234 mg into the muscle once.     isosorbide dinitrate 30  MG Tab  Commonly known as: ISORDIL  Take 30 mg by mouth Daily.     metoprolol tartrate 25 MG tablet  Commonly known as: LOPRESSOR  Take 25 mg by mouth Daily.     OXcarbazepine 600 MG Tab  Commonly known as: TRILEPTAL  Take 600 mg by mouth 2 (two) times daily.     QUEtiapine 400 MG tablet  Commonly known as: SEROQUEL  Take 400 mg by mouth every evening.     rivaroxaban 20 mg Tab  Commonly known as: XARELTO  Take 1 tablet (20 mg total) by mouth once daily.     SYMBICORT 80-4.5 mcg/actuation aa  Generic drug: budesonide-formoterol 80-4.5 mcg  Inhale 2 puffs into the lungs 2 (two) times a day.     valsartan-hydrochlorothiazide 80-12.5 mg per tablet  Commonly known as: DIOVAN-HCT  Take 1 tablet by mouth.            Indwelling Lines/Drains at time of discharge:   Lines/Drains/Airways     None                 Time spent on the discharge of patient: 45 minutes         Adams Hernandez MD  Department of Hospital Medicine  [place] - Telemetry

## 2023-04-25 NOTE — PLAN OF CARE
Discharge orders noted. AVS prepared with medication list, importance of medication compliance, follow up appointments, diet, home care instructions, treatment plan, self management, and when to seek medical attention. Detailed clinical reference list attached. AVS printed and handed to patient by bedside nurse. VN reviewed discharge instructions with patient using teachback method.  Allowed time for questions, all questions answered.  Patient verbalized complete understanding of discharge instructions and voices no concerns.      Discharge instructions complete.  No new meds, pt waiting on transportation.  Bedside nurse notified.

## 2023-04-25 NOTE — PROGRESS NOTES
[facility] Medicine  Progress Note    Patient Name: Rose Sparks  MRN: [id number]  Patient Class: OP- Observation   Admission Date: 4/23/2023  Length of Stay: 0 days  Attending Physician: Frank Hollingsworth DO  Primary Care Provider: Start Robe        Subjective:     Principal Problem:Numbness and tingling of left side of face        HPI:  47 yo female with pmh of schizophrenia, DVT, coronary artery disease pulmonary embolus who presented to [facility] emergency department with left-sided numbness and dizziness. Symptoms started 4/21 at 7 pm and persisted into 4/22 am. Pt also initially endorsed headache.  Also endorsed left-sided chest tightness and shortness of breath.  Patient has been adherent to all her medications only recent medication change was starting Seroquel and discontinuing trazodone 2 weeks ago for her insomnia which has improved.  Denies any weakness, vision changes, change in speech and has had similar episodes of dizziness before.    In outside ED patient's headache improved, CT head did not reveal any acute intracranial findings.  Patient was transferred to [facility] for neurologic evaluation and MRI which were unavailable at [facility].  [facility] family [facility] was consulted for admission for patient's left-sided numbness potentially secondary to stroke neurological workup.              Overview/Hospital Course:  No notes on file    Interval History: NAOEN, MRI, MRA negative, facial numbness resolved    Review of Systems   Constitutional:  Negative for chills, fever and unexpected weight change.   HENT:  Negative for congestion, rhinorrhea, sneezing and sore throat.    Eyes:  Negative for redness and visual disturbance.   Respiratory:  Negative for cough, chest tightness, shortness of breath and wheezing.    Cardiovascular:  Negative for chest pain and leg swelling.   Gastrointestinal:  Negative for abdominal pain, blood in stool, constipation and diarrhea.    Genitourinary:  Negative for dysuria and hematuria.   Musculoskeletal:  Negative for arthralgias and joint swelling.   Skin:  Negative for color change and pallor.   Neurological:  Negative for light-headedness, numbness and headaches.   Psychiatric/Behavioral:  Negative for agitation and confusion.    Objective:     Vital Signs (Most Recent):  Temp: 97.6 °F (36.4 °C) (04/24/23 0756)  Pulse: 108 (04/24/23 0756)  Resp: 18 (04/24/23 0756)  BP: 132/63 (04/24/23 0756)  SpO2: (!) 94 % (04/24/23 0831)   Vital Signs (24h Range):  Temp:  [97.5 °F (36.4 °C)-98.5 °F (36.9 °C)] 97.6 °F (36.4 °C)  Pulse:  [] 108  Resp:  [16-18] 18  SpO2:  [93 %-97 %] 94 %  BP: (101-139)/(63-87) 132/63     Weight: 121.1 kg (267 lb)  Body mass index is 45.83 kg/m².  No intake or output data in the 24 hours ending 04/24/23 1142   Physical Exam  Vitals and nursing note reviewed.   Constitutional:       Appearance: Normal appearance.   HENT:      Head: Normocephalic and atraumatic.      Right Ear: Tympanic membrane normal.      Left Ear: Tympanic membrane normal.      Mouth/Throat:      Mouth: Mucous membranes are moist.      Pharynx: Oropharynx is clear.   Eyes:      Extraocular Movements: Extraocular movements intact.      Pupils: Pupils are equal, round, and reactive to light.   Cardiovascular:      Rate and Rhythm: Normal rate and regular rhythm.      Pulses: Normal pulses.      Heart sounds: Normal heart sounds.   Pulmonary:      Effort: Pulmonary effort is normal.      Breath sounds: Normal breath sounds.   Abdominal:      General: Abdomen is flat.      Palpations: Abdomen is soft.   Musculoskeletal:         General: Normal range of motion.      Cervical back: Normal range of motion.   Skin:     General: Skin is warm.   Neurological:      Mental Status: She is alert and oriented to person, place, and time.      Comments: Left sided facial numbness resolved , no weakness appreciated    Psychiatric:         Mood and Affect: Mood normal.        Significant Labs: All pertinent labs within the past 24 hours have been reviewed.  CBC:   Recent Labs   Lab 04/22/23  1828 04/24/23  0315   WBC 11.40 11.22   HGB 15.6 16.2*   HCT 45.4 47.4    200       CMP:   Recent Labs   Lab 04/22/23  1828 04/24/23  0315    137   K 3.7 3.9    102   CO2 29 26   * 110   BUN 11 8   CREATININE 0.97 1.0   CALCIUM 9.1 9.3   PROT 6.7 6.5   ALBUMIN 3.9 3.3*   BILITOT 0.5 0.4   ALKPHOS 90 79   AST 28 11   ALT 31 18   ANIONGAP 7* 9         Significant Imaging: I have reviewed all pertinent imaging results/findings within the past 24 hours.      Assessment/Plan:      * Numbness and tingling of left side of face  Endorsed improving, 2 day history of left-sided numbness of the face  CT at outside ED negative  Numbness now resolved    Plan:   -MRI, MRA head and neck negative   -consult PT OT SLP   -consult Neurology appreciate recs   -already on Xarelto for chronic PE, allergy to aspirin  -continue atorvastatin 40 mg   -thiamine  - repleted B12      Seizures  Continue home trileptal       Schizophrenia  Continue home Seroquel, last got Invega the beginning of this month      Asthma  On albuterol and Symbicort     Continue albuterol while inpatient as needed    Bipolar 1 disorder        Anxiety and depression  Continue home hydroxyzine, BuSpar, Klonopin      Recurrent pulmonary embolism  Continue home Xarelto      Primary insomnia  Continue home Seroquel        VTE Risk Mitigation (From admission, onward)         Ordered     rivaroxaban tablet 20 mg  Daily         04/23/23 1237     Reason for No Pharmacological VTE Prophylaxis  Once        Question:  Reasons:  Answer:  Already adequately anticoagulated on oral Anticoagulants    04/23/23 1237     IP VTE HIGH RISK PATIENT  Once         04/23/23 1237     Place sequential compression device  Until discontinued         04/23/23 1237                Discharge Planning   KATIE:      Code Status: Full Code   Is the patient  medically ready for discharge?:     Reason for patient still in hospital (select all that apply): Treatment  Discharge Plan A: Home with family, Home Health        Adams Hernandez MD  Department of Hospital Medicine   [facility]

## 2023-04-25 NOTE — NURSING
Discharge instruction and education packet provided. VN notified. IV site removed cath tip intact. Telemetry discontinued without adverse reaction. Patient shows no acute distress. Transport set up for 11:45.

## 2023-04-25 NOTE — PLAN OF CARE
D/C recs noted. Pt to have PCP and Neuro follow up. Pt to resume outpatient psych care. Pharmacist will go over home medications and reasons for medications. VN and bedside nurse to reiterate final discharge instructions.      At time of discharge pt will be transported home by [facility] ambulatory van. SW to schedule  for 11:45am. ETA can be checked under encounters.     DME at discharge: none  Home Health: none    Pt has follow up appointments requested.     04/25/23 1038   Final Note   Assessment Type Final Discharge Note   Anticipated Discharge Disposition Home   What phone number can be called within the next 1-3 days to see how you are doing after discharge? [phone]   Hospital Resources/Appts/Education Provided Appointments scheduled by Navigator/Coordinator   Post-Acute Status   Discharge Delays None known at this time       Future Appointments   Date Time Provider Department Center   5/10/2023 11:30 AM KRISHAN SELBY CHA, CSW [facility]  [phone]

## 2023-04-25 NOTE — PLAN OF CARE
Problem: Adult Inpatient Plan of Care  Goal: Plan of Care Review  Outcome: Ongoing, Progressing  Goal: Patient-Specific Goal (Individualized)  Outcome: Ongoing, Progressing     Problem: Bariatric Environmental Safety  Goal: Safety Maintained with Care  Outcome: Ongoing, Progressing     Problem: Adult Inpatient Plan of Care  Goal: Plan of Care Review  4/25/2023 0320 by Aurora Lewis RN  Outcome: Ongoing, Progressing  4/25/2023 0319 by Aurora Lewis RN  Outcome: Ongoing, Progressing  Goal: Patient-Specific Goal (Individualized)  4/25/2023 0320 by Aurora Lewis RN  Outcome: Ongoing, Progressing  4/25/2023 0319 by Aurora Lewis RN  Outcome: Ongoing, Progressing     Problem: Bariatric Environmental Safety  Goal: Safety Maintained with Care  4/25/2023 0320 by Aurora Lewis RN  Outcome: Ongoing, Progressing  4/25/2023 0319 by Aurora Lewis RN  Outcome: Ongoing, Progressing     Problem: Infection  Goal: Absence of Infection Signs and Symptoms  Outcome: Ongoing, Progressing     Problem: Adjustment to Illness (Stroke, Ischemic/Transient Ischemic Attack)  Goal: Optimal Coping  Outcome: Ongoing, Progressing     Problem: Behavioral Health Comorbidity  Goal: Maintenance of Behavioral Health Symptom Control  Outcome: Ongoing, Progressing

## 2023-04-25 NOTE — NURSING
Pro[facility] Nurse Follow-up Note     Chart reviewed for follow up rounding.  No acute issues at this time. BP now 122/71.  Blood pressure medications held.  Please call Proactive RN, Lakeshia Tanner RN with any questions or concerns at [phone]

## 2023-04-28 LAB — VIT B1 BLD-MCNC: 80 UG/L (ref 38–122)
